# Patient Record
Sex: MALE | Race: BLACK OR AFRICAN AMERICAN | NOT HISPANIC OR LATINO
[De-identification: names, ages, dates, MRNs, and addresses within clinical notes are randomized per-mention and may not be internally consistent; named-entity substitution may affect disease eponyms.]

---

## 2017-04-19 ENCOUNTER — APPOINTMENT (OUTPATIENT)
Dept: UROLOGY | Facility: CLINIC | Age: 62
End: 2017-04-19

## 2017-05-07 ENCOUNTER — OUTPATIENT (OUTPATIENT)
Dept: OUTPATIENT SERVICES | Facility: HOSPITAL | Age: 62
LOS: 1 days | Discharge: HOME | End: 2017-05-07

## 2017-06-28 DIAGNOSIS — N20.2 CALCULUS OF KIDNEY WITH CALCULUS OF URETER: ICD-10-CM

## 2017-11-17 ENCOUNTER — OUTPATIENT (OUTPATIENT)
Dept: OUTPATIENT SERVICES | Facility: HOSPITAL | Age: 62
LOS: 1 days | Discharge: HOME | End: 2017-11-17

## 2017-11-17 ENCOUNTER — EMERGENCY (EMERGENCY)
Facility: HOSPITAL | Age: 62
LOS: 0 days | Discharge: HOME | End: 2017-11-18
Admitting: INTERNAL MEDICINE

## 2017-11-17 DIAGNOSIS — R10.31 RIGHT LOWER QUADRANT PAIN: ICD-10-CM

## 2017-11-17 DIAGNOSIS — N50.82 SCROTAL PAIN: ICD-10-CM

## 2017-11-17 DIAGNOSIS — Z79.899 OTHER LONG TERM (CURRENT) DRUG THERAPY: ICD-10-CM

## 2017-11-17 DIAGNOSIS — I10 ESSENTIAL (PRIMARY) HYPERTENSION: ICD-10-CM

## 2017-11-17 DIAGNOSIS — N20.0 CALCULUS OF KIDNEY: ICD-10-CM

## 2017-11-22 ENCOUNTER — APPOINTMENT (OUTPATIENT)
Dept: UROLOGY | Facility: CLINIC | Age: 62
End: 2017-11-22
Payer: COMMERCIAL

## 2017-11-22 VITALS
DIASTOLIC BLOOD PRESSURE: 87 MMHG | BODY MASS INDEX: 39.34 KG/M2 | HEIGHT: 71 IN | WEIGHT: 281 LBS | SYSTOLIC BLOOD PRESSURE: 134 MMHG | HEART RATE: 66 BPM

## 2017-11-22 DIAGNOSIS — Z78.9 OTHER SPECIFIED HEALTH STATUS: ICD-10-CM

## 2017-11-22 PROCEDURE — 99214 OFFICE O/P EST MOD 30 MIN: CPT

## 2017-12-27 ENCOUNTER — EMERGENCY (EMERGENCY)
Facility: HOSPITAL | Age: 62
LOS: 0 days | Discharge: HOME | End: 2017-12-27
Admitting: INTERNAL MEDICINE

## 2017-12-27 DIAGNOSIS — K52.9 NONINFECTIVE GASTROENTERITIS AND COLITIS, UNSPECIFIED: ICD-10-CM

## 2017-12-27 DIAGNOSIS — R10.9 UNSPECIFIED ABDOMINAL PAIN: ICD-10-CM

## 2018-01-11 ENCOUNTER — APPOINTMENT (OUTPATIENT)
Dept: UROLOGY | Facility: CLINIC | Age: 63
End: 2018-01-11
Payer: COMMERCIAL

## 2018-01-11 VITALS
WEIGHT: 281 LBS | HEIGHT: 71 IN | HEART RATE: 65 BPM | BODY MASS INDEX: 39.34 KG/M2 | DIASTOLIC BLOOD PRESSURE: 87 MMHG | SYSTOLIC BLOOD PRESSURE: 138 MMHG

## 2018-01-11 PROCEDURE — 99213 OFFICE O/P EST LOW 20 MIN: CPT

## 2018-03-06 ENCOUNTER — OTHER (OUTPATIENT)
Age: 63
End: 2018-03-06

## 2018-03-08 ENCOUNTER — APPOINTMENT (OUTPATIENT)
Dept: UROLOGY | Facility: CLINIC | Age: 63
End: 2018-03-08
Payer: COMMERCIAL

## 2018-03-08 VITALS
HEART RATE: 69 BPM | SYSTOLIC BLOOD PRESSURE: 145 MMHG | HEIGHT: 71 IN | BODY MASS INDEX: 39.34 KG/M2 | WEIGHT: 281 LBS | DIASTOLIC BLOOD PRESSURE: 78 MMHG

## 2018-03-08 PROCEDURE — 99213 OFFICE O/P EST LOW 20 MIN: CPT

## 2018-03-26 ENCOUNTER — OUTPATIENT (OUTPATIENT)
Dept: OUTPATIENT SERVICES | Facility: HOSPITAL | Age: 63
LOS: 1 days | Discharge: HOME | End: 2018-03-26

## 2018-03-26 DIAGNOSIS — C61 MALIGNANT NEOPLASM OF PROSTATE: ICD-10-CM

## 2018-04-12 ENCOUNTER — APPOINTMENT (OUTPATIENT)
Dept: UROLOGY | Facility: CLINIC | Age: 63
End: 2018-04-12
Payer: COMMERCIAL

## 2018-04-12 VITALS
WEIGHT: 281 LBS | BODY MASS INDEX: 39.34 KG/M2 | HEART RATE: 67 BPM | SYSTOLIC BLOOD PRESSURE: 150 MMHG | DIASTOLIC BLOOD PRESSURE: 93 MMHG | HEIGHT: 71 IN

## 2018-04-12 PROCEDURE — 99212 OFFICE O/P EST SF 10 MIN: CPT

## 2018-08-30 ENCOUNTER — APPOINTMENT (OUTPATIENT)
Dept: UROLOGY | Facility: CLINIC | Age: 63
End: 2018-08-30
Payer: COMMERCIAL

## 2018-08-30 VITALS
BODY MASS INDEX: 39.34 KG/M2 | HEIGHT: 71 IN | DIASTOLIC BLOOD PRESSURE: 86 MMHG | SYSTOLIC BLOOD PRESSURE: 134 MMHG | WEIGHT: 281 LBS | HEART RATE: 66 BPM

## 2018-08-30 PROCEDURE — 99213 OFFICE O/P EST LOW 20 MIN: CPT

## 2018-08-30 NOTE — REVIEW OF SYSTEMS
[see HPI] : see HPI [Negative] : Psychiatric [Erectile Dysfunction] : erectile dysfunction [FreeTextEntry1] : discomfort in the left groin

## 2018-08-30 NOTE — LETTER HEADER
[FreeTextEntry3] : Mikhail Aparicio M.D.\par Director of Urology\par [facility]\par [address] [place] [address], [address]\[place] [address]

## 2018-08-30 NOTE — HISTORY OF PRESENT ILLNESS
[Erectile Dysfunction] : Erectile Dysfunction [FreeTextEntry1] : Joaquin is a 62 year old male, with a history of Abdirizak 7 (4+3) prostate cancer, S/P RRP in 2005. He has a slowly rising PSA, currently being followed by Dr. Serrano. \par \par He is being managed with Tri-mix for ED, 0.05, with decent results. He said recently, that he is experiencing some decrease in rigidity after injection, though the erection he does get last about 45 minutes and is definitely usable. He has a history of nephrolithiasis, last imaged on 12/2017, which showed a 5 mm non obstructing RIGHT calculus. He denies any episodes for renal colic since his last visit. He denies any significant irritative/obstructive voiding symptoms.\par

## 2018-08-30 NOTE — ASSESSMENT
[FreeTextEntry1] : We discussed his erections. He is obtaining decent erections at 0.05 of Tri-mix however, he would like a bit more firmness. We discussed the options and he is electing to continue Tri-mix however, he will increase his dose from 0.05 to 0.075. He understands the risks of prolonged erections with this medications and to contact the office/go to ED if they occur. He also has the fibrosis so needs to keep the compression for the full five minutes. I do not know what happened with respect to his kidney stone and if it is growing larger we may have to preempt, he recently had renal colic and passed the stone but I believe that was on the other side. He will follow up here in January 2019 with CT scan. He will continue follow up with Dr. Serrano regarding his prostate cancer management.

## 2018-08-30 NOTE — PHYSICAL EXAM
[General Appearance - Well Developed] : well developed [General Appearance - Well Nourished] : well nourished [Normal Appearance] : normal appearance [Well Groomed] : well groomed [General Appearance - In No Acute Distress] : no acute distress [Bowel Sounds] : normal bowel sounds [Abdomen Soft] : soft [Abdomen Tenderness] : non-tender [Costovertebral Angle Tenderness] : no ~M costovertebral angle tenderness [Urethral Meatus] : meatus normal [Penis Abnormality] : normal uncircumcised penis [Urinary Bladder Findings] : the bladder was normal on palpation [Scrotum] : the scrotum was normal [Testes Mass (___cm)] : there were no testicular masses [Heart Rate And Rhythm] : Heart rate and rhythm were normal [Edema] : no peripheral edema [] : no respiratory distress [Respiration, Rhythm And Depth] : normal respiratory rhythm and effort [Exaggerated Use Of Accessory Muscles For Inspiration] : no accessory muscle use [Oriented To Time, Place, And Person] : oriented to person, place, and time [Affect] : the affect was normal [Mood] : the mood was normal [Not Anxious] : not anxious [Normal Station and Gait] : the gait and station were normal for the patient's age [No Focal Deficits] : no focal deficits [No Palpable Adenopathy] : no palpable adenopathy [Testes Tenderness] : no tenderness of the testes [FreeTextEntry1] : 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

## 2018-08-31 ENCOUNTER — OTHER (OUTPATIENT)
Age: 63
End: 2018-08-31

## 2018-10-02 ENCOUNTER — OUTPATIENT (OUTPATIENT)
Dept: OUTPATIENT SERVICES | Facility: HOSPITAL | Age: 63
LOS: 1 days | Discharge: HOME | End: 2018-10-02

## 2018-10-02 DIAGNOSIS — N20.0 CALCULUS OF KIDNEY: ICD-10-CM

## 2018-12-06 ENCOUNTER — APPOINTMENT (OUTPATIENT)
Dept: UROLOGY | Facility: CLINIC | Age: 63
End: 2018-12-06

## 2019-05-21 ENCOUNTER — APPOINTMENT (OUTPATIENT)
Dept: PLASTIC SURGERY | Facility: CLINIC | Age: 64
End: 2019-05-21
Payer: COMMERCIAL

## 2019-05-21 VITALS — BODY MASS INDEX: 37.8 KG/M2 | HEIGHT: 71 IN | WEIGHT: 270 LBS

## 2019-05-21 DIAGNOSIS — D17.21 BENIGN LIPOMATOUS NEOPLASM OF SKIN AND SUBCUTANEOUS TISSUE OF RIGHT ARM: ICD-10-CM

## 2019-05-21 PROCEDURE — 99203 OFFICE O/P NEW LOW 30 MIN: CPT

## 2019-05-21 RX ORDER — AMOXICILLIN 500 MG/1
500 TABLET, FILM COATED ORAL
Qty: 21 | Refills: 0 | Status: DISCONTINUED | COMMUNITY
Start: 2017-11-20 | End: 2019-05-21

## 2019-05-21 RX ORDER — CIPROFLOXACIN HYDROCHLORIDE 500 MG/1
500 TABLET, FILM COATED ORAL
Qty: 14 | Refills: 0 | Status: DISCONTINUED | COMMUNITY
Start: 2017-12-28 | End: 2019-05-21

## 2019-05-21 NOTE — ASSESSMENT
[FreeTextEntry1] : 64 yo M with right volar wrist  mass c/w lipoma. \par \par - recommend excision in OLY\par \par as above\par \par lipoma approx 4cm x 3cm\par \par Regarding the hand surgery, we discussed the risk of bleeding, infection, need for additional unplanned surgery, need for postoperative hand occupational therapy, possible lack of improvement and diminished hand function.  We discussed prolonged recovery.  All questions were answered and all risks were well understood by the patient.\par

## 2019-05-21 NOTE — HISTORY OF PRESENT ILLNESS
[FreeTextEntry1] : 64 yo M with PMH of HTN and Prostate Ca s/p radical prostatectomy in 2005 who is RHD and presents today for evaluation of right volar wrist subcutaneous mass for 8 years. Reports gradual increase in size causing pain and discomfort.  Denies any hand trauma. NO prior injections or aspirations.

## 2019-05-21 NOTE — PHYSICAL EXAM
[de-identified] : well-developed well groomed male, overweight  [de-identified] : NC/AT [de-identified] : PERRL [de-identified] : supple [de-identified] : good inspiratory effort [de-identified] : TASNEEMR [de-identified] : soft, nontender [de-identified] : right volar wrist with a soft, mobile subcutaneous mass c/w lipoma, nontender, FROM of the wrist joint with intact sensation

## 2019-05-30 ENCOUNTER — APPOINTMENT (OUTPATIENT)
Dept: SURGERY | Facility: CLINIC | Age: 64
End: 2019-05-30
Payer: COMMERCIAL

## 2019-05-30 VITALS
HEIGHT: 71 IN | OXYGEN SATURATION: 98 % | DIASTOLIC BLOOD PRESSURE: 85 MMHG | SYSTOLIC BLOOD PRESSURE: 135 MMHG | WEIGHT: 270 LBS | HEART RATE: 67 BPM | BODY MASS INDEX: 37.8 KG/M2

## 2019-05-30 DIAGNOSIS — R22.1 LOCALIZED SWELLING, MASS AND LUMP, NECK: ICD-10-CM

## 2019-05-30 PROCEDURE — 99243 OFF/OP CNSLTJ NEW/EST LOW 30: CPT

## 2019-05-30 NOTE — ASSESSMENT
[FreeTextEntry1] : After examination patient was explained about the findings of the MRI from September of 2018. As per patient's history the mass has increased in size. Because of the recommendation of the MRI report from 2018 as well as increasing in size a followup MRI was recommended. We will try to get authorization of MRI of the soft tissue of the neck. I called [facility] radiology and talk to the chief technician Moshe for further discussion of the MRI. Patient was told to return to the office after the MRI is done for further evaluation and treatment.

## 2019-05-30 NOTE — DATA REVIEWED
[FreeTextEntry1] : The radiological report from 2018 September when reviewed. The sonogram failed to show any mass in the left supraclavicular region. MRI showed a 5 x 4 cm fatty mass in the left supraclavicular region and followup MRI of the soft tissue of the neck with and without contrast was suggested.

## 2019-05-30 NOTE — HISTORY OF PRESENT ILLNESS
[de-identified] : Patient presents to the office with a history of left-sided neck mass that has been growing. First time he noticed the mass was more than 12 years ago. As per  the patient the mass has been increasing in size.

## 2019-05-30 NOTE — PHYSICAL EXAM
[No Rash or Lesion] : No rash or lesion [Purpura] : no purpura  [Petechiae] : no petechiae [Skin Ulcer] : no ulcer [Alert] : alert [Oriented to Person] : oriented to person [Oriented to Place] : oriented to place [Oriented to Time] : oriented to time [Calm] : calm [de-identified] : normal [de-identified] : In the left supraclavicular region approximately 7 cm size mass was palpated. The mass is nontender.

## 2019-05-30 NOTE — CONSULT LETTER
[Dear  ___] : Dear  [unfilled], [Consult Letter:] : I had the pleasure of evaluating your patient, [unfilled]. [Please see my note below.] : Please see my note below. [Consult Closing:] : Thank you very much for allowing me to participate in the care of this patient.  If you have any questions, please do not hesitate to contact me. [Sincerely,] : Sincerely, [FreeTextEntry3] : Jose Mcgregor MD, FACS\par [facility]\[place] [address]\par

## 2019-06-13 ENCOUNTER — APPOINTMENT (OUTPATIENT)
Dept: UROLOGY | Facility: CLINIC | Age: 64
End: 2019-06-13
Payer: COMMERCIAL

## 2019-06-13 VITALS
BODY MASS INDEX: 40.6 KG/M2 | SYSTOLIC BLOOD PRESSURE: 149 MMHG | WEIGHT: 290 LBS | HEART RATE: 70 BPM | HEIGHT: 71 IN | DIASTOLIC BLOOD PRESSURE: 87 MMHG

## 2019-06-13 DIAGNOSIS — E66.01 MORBID (SEVERE) OBESITY DUE TO EXCESS CALORIES: ICD-10-CM

## 2019-06-13 PROCEDURE — 99214 OFFICE O/P EST MOD 30 MIN: CPT

## 2019-06-13 RX ORDER — METRONIDAZOLE 500 MG/1
500 TABLET ORAL
Qty: 21 | Refills: 0 | Status: COMPLETED | COMMUNITY
Start: 2017-12-28 | End: 2019-06-13

## 2019-06-13 NOTE — ADDENDUM
[FreeTextEntry1] : Please note after the exam was finished he told me’s been having some pain in the left groin when he lies on his abdomen. I did not find any signs of hernia and what we’ll do is just take another look they are either when he sees Dr. Serrano and myself.

## 2019-06-13 NOTE — PHYSICAL EXAM
[General Appearance - Well Developed] : well developed [Normal Appearance] : normal appearance [General Appearance - Well Nourished] : well nourished [Well Groomed] : well groomed [General Appearance - In No Acute Distress] : no acute distress [Abdomen Tenderness] : non-tender [Abdomen Soft] : soft [Costovertebral Angle Tenderness] : no ~M costovertebral angle tenderness [Urethral Meatus] : meatus normal [Penis Abnormality] : normal uncircumcised penis [Epididymis] : the epididymides were normal [Testes Tenderness] : no tenderness of the testes [Testes Mass (___cm)] : there were no testicular masses [Heart Rate And Rhythm] : Heart rate and rhythm were normal [FreeTextEntry1] : rle mild edema [] : no respiratory distress [Respiration, Rhythm And Depth] : normal respiratory rhythm and effort [Exaggerated Use Of Accessory Muscles For Inspiration] : no accessory muscle use [Auscultation Breath Sounds / Voice Sounds] : lungs were clear to auscultation bilaterally [Oriented To Time, Place, And Person] : oriented to person, place, and time [Affect] : the affect was normal [Not Anxious] : not anxious [Mood] : the mood was normal [Normal Station and Gait] : the gait and station were normal for the patient's age [No Focal Deficits] : no focal deficits

## 2019-06-13 NOTE — ASSESSMENT
[FreeTextEntry1] : His physical exam shows increasing signs of obesity. He has a slump in his left clavicle which to me feels like a lipoma but an MRI is pending. His penis does not have any major signs of scarring his testicles are not atrophied and I don’t feel a hernia. I did not do a ASIA as we are sending him for a PSA tomorrow and he will be seen Dr. Serrano re-his prostate cancer.\par \par From urologic side we will send him for a CAT scan, I’m sending him for a.m. bloods to check his hormones and his PSA, I am reordering the tri-mix so will get a fresh bottle and will drop down to 0.04 raising it as needed until he gets a good erection or he gets a bad reaction. He will come back in to review the hormones, his response of the new tri-mix the CAT scan and his prostate cancer will be evaluated by Dr. Serrano\par

## 2019-06-13 NOTE — REVIEW OF SYSTEMS
[see HPI] : see HPI [Erectile Dysfunction] : erectile dysfunction [Negative] : Psychiatric [FreeTextEntry1] : discomfort in the left groin, increasing obesity

## 2019-06-13 NOTE — LETTER BODY
[Dear  ___] : Dear  [unfilled], [Courtesy Letter:] : I had the pleasure of seeing your patient, [unfilled], in my office today. [Please see my note below.] : Please see my note below. [Sincerely,] : Sincerely, [FreeTextEntry2] : Margaret Pride MD\par [address] [place] [address]\[place] [address]\par

## 2019-06-13 NOTE — LETTER HEADER
[FreeTextEntry3] : Mikhail Aparicio M.D.\par Director of Urology\par [facility]\par [address] [place] [address], [address]\[place] [address]

## 2019-06-13 NOTE — HISTORY OF PRESENT ILLNESS
[FreeTextEntry1] : Gaston is not been seen since August 2018. At that point we had blood test that showed him to have low testosterone but his PSA is becoming detectable. He went to see Dr. Serrano who is been following him with repeat bloods pending but not yet been done. He also had kidney stones with the CAT scan showing two stones on the right. He is not had for a kidney stone pain though he has passed some of the past, and hasn’t had an evaluation since October 2018 either. Finally he has been on tri-mix initially with 0.03 which was posted go up to 0.075. He’s not using it that often, and he tells me that when he does lately that is not working that well. However the stuff is over six months old and may be getting week and or he may be progressing. Please note his weight has gone up probably at least 20 pounds and is now over 290. When asked he does acknowledge that his wife’s weight is in competition with his and it just makes it that much more difficult when the both morbidly obese to have good penis vaginal contact. However they still enjoy each other and I still have fun and he still wants it to work well enough that they can continue. [Currently Experiencing ___] :  [unfilled] [Erectile Dysfunction] : Erectile Dysfunction

## 2019-06-28 ENCOUNTER — OTHER (OUTPATIENT)
Age: 64
End: 2019-06-28

## 2019-07-01 ENCOUNTER — APPOINTMENT (OUTPATIENT)
Dept: CARDIOLOGY | Facility: CLINIC | Age: 64
End: 2019-07-01

## 2019-07-05 ENCOUNTER — OUTPATIENT (OUTPATIENT)
Dept: OUTPATIENT SERVICES | Facility: HOSPITAL | Age: 64
LOS: 1 days | Discharge: HOME | End: 2019-07-05

## 2019-07-05 DIAGNOSIS — N52.31 ERECTILE DYSFUNCTION FOLLOWING RADICAL PROSTATECTOMY: ICD-10-CM

## 2019-07-05 DIAGNOSIS — R79.89 OTHER SPECIFIED ABNORMAL FINDINGS OF BLOOD CHEMISTRY: ICD-10-CM

## 2019-07-05 DIAGNOSIS — N20.0 CALCULUS OF KIDNEY: ICD-10-CM

## 2019-07-08 ENCOUNTER — APPOINTMENT (OUTPATIENT)
Dept: UROLOGY | Facility: CLINIC | Age: 64
End: 2019-07-08

## 2019-07-08 LAB
ALBUMIN SERPL ELPH-MCNC: 4.4 G/DL
ALP BLD-CCNC: 62 U/L
ALT SERPL-CCNC: 18 U/L
AST SERPL-CCNC: 16 U/L
BASOPHILS # BLD AUTO: 0.02 K/UL
BASOPHILS NFR BLD AUTO: 0.5 %
BILIRUB DIRECT SERPL-MCNC: <0.2 MG/DL
BILIRUB INDIRECT SERPL-MCNC: >0.5 MG/DL
BILIRUB SERPL-MCNC: 0.7 MG/DL
EOSINOPHIL # BLD AUTO: 0.13 K/UL
EOSINOPHIL NFR BLD AUTO: 3.1 %
ESTRADIOL SERPL-MCNC: 28 PG/ML
HCT VFR BLD CALC: 42.1 %
HGB BLD-MCNC: 14.2 G/DL
IMM GRANULOCYTES NFR BLD AUTO: 0 %
LH SERPL-ACNC: 3.8 IU/L
LYMPHOCYTES # BLD AUTO: 2.11 K/UL
LYMPHOCYTES NFR BLD AUTO: 51.1 %
MAN DIFF?: NORMAL
MCHC RBC-ENTMCNC: 29.8 PG
MCHC RBC-ENTMCNC: 33.7 G/DL
MCV RBC AUTO: 88.4 FL
MONOCYTES # BLD AUTO: 0.42 K/UL
MONOCYTES NFR BLD AUTO: 10.2 %
NEUTROPHILS # BLD AUTO: 1.45 K/UL
NEUTROPHILS NFR BLD AUTO: 35.1 %
PLATELET # BLD AUTO: 240 K/UL
PROT SERPL-MCNC: 7.7 G/DL
PSA FREE FLD-MCNC: 2 %
PSA FREE SERPL-MCNC: 0.02 NG/ML
PSA SERPL-MCNC: 1.34 NG/ML
RBC # BLD: 4.76 M/UL
RBC # FLD: 13 %
WBC # FLD AUTO: 4.13 K/UL

## 2019-07-10 LAB
SHBG SERPL-SCNC: 37 NMOL/L
TESTOST BND SERPL-MCNC: 5.21 NG/DL
TESTOSTERONE BIOAVAILABLE: 38 NG/DL
TESTOSTERONE TOTAL S: 237 NG/DL

## 2019-07-11 ENCOUNTER — APPOINTMENT (OUTPATIENT)
Dept: UROLOGY | Facility: CLINIC | Age: 64
End: 2019-07-11
Payer: COMMERCIAL

## 2019-07-11 ENCOUNTER — LABORATORY RESULT (OUTPATIENT)
Age: 64
End: 2019-07-11

## 2019-07-11 ENCOUNTER — OUTPATIENT (OUTPATIENT)
Dept: OUTPATIENT SERVICES | Facility: HOSPITAL | Age: 64
LOS: 1 days | Discharge: HOME | End: 2019-07-11

## 2019-07-11 VITALS
SYSTOLIC BLOOD PRESSURE: 137 MMHG | HEIGHT: 71 IN | HEART RATE: 61 BPM | BODY MASS INDEX: 40.6 KG/M2 | DIASTOLIC BLOOD PRESSURE: 89 MMHG | WEIGHT: 290 LBS

## 2019-07-11 DIAGNOSIS — R79.89 OTHER SPECIFIED ABNORMAL FINDINGS OF BLOOD CHEMISTRY: ICD-10-CM

## 2019-07-11 DIAGNOSIS — R74.8 ABNORMAL LEVELS OF OTHER SERUM ENZYMES: ICD-10-CM

## 2019-07-11 LAB
% FREE TESTOSTERONE - ESO: 1.4 %
FREE TESTOSTERONE - ESO: 34 PG/ML
SHBG-ESOTERIX: 47.9 NMOL/L
TESTOSTERONE SERUM - ESO: 242 NG/DL

## 2019-07-11 PROCEDURE — 99214 OFFICE O/P EST MOD 30 MIN: CPT

## 2019-07-11 NOTE — REVIEW OF SYSTEMS
[Erectile Dysfunction] : erectile dysfunction [see HPI] : see HPI [Joint Pain] : joint pain [Negative] : Heme/Lymph

## 2019-07-11 NOTE — LETTER BODY
[Courtesy Letter:] : I had the pleasure of seeing your patient, [unfilled], in my office today. [Dear  ___] : Dear  [unfilled], [Please see my note below.] : Please see my note below. [Sincerely,] : Sincerely, [FreeTextEntry2] : Margaret Pride MD\par [address] [place] [address]\[place] [address]\par

## 2019-07-11 NOTE — LETTER HEADER
[FreeTextEntry3] : Mikhail Aparicio M.D.\par Director of Urology\par [facility]\par [address] [place] [address], [address]\[place] [address]

## 2019-07-11 NOTE — PHYSICAL EXAM
[Normal Appearance] : normal appearance [General Appearance - Well Developed] : well developed [General Appearance - Well Nourished] : well nourished [General Appearance - In No Acute Distress] : no acute distress [Well Groomed] : well groomed [] : no respiratory distress [FreeTextEntry1] : obese [Respiration, Rhythm And Depth] : normal respiratory rhythm and effort [Oriented To Time, Place, And Person] : oriented to person, place, and time [Exaggerated Use Of Accessory Muscles For Inspiration] : no accessory muscle use [Mood] : the mood was normal [Affect] : the affect was normal [Not Anxious] : not anxious [Normal Station and Gait] : the gait and station were normal for the patient's age

## 2019-07-11 NOTE — ASSESSMENT
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 His PSA something he will review with Dr. Serrano was 1.34 with the free PSA of only 2%. In September 2018 it was 1.1 so it’s gone up about 20% in 10 months. The rest of his was at an acceptable hemoglobin at 14.2 platelet count at 240 K. Liver function tests were normal while the LH was 3.8 and estradiol 28. Sex hormone binding globulin was 37\par The CAT scan done at [facility] July 10, 2019 showed a persistent 4 cm cyst in the inferior Gael the left kidney and an 8 mm stone in the inferior region of the right kidney. There was no evidence of obstruction he is status post prostatectomy and the bladder was empty. There were multiple live the lesion seen probably cysts was stones in the gallbladder and a right upper quadrant ultrasound is recommended this is something he will bring up this primary care doctor and probably see a general surgeon four.\par With respect to his erectile dysfunction he should raise the dose as needed until he gets to a dose that gets him rigid enough long enough without going too high. The overdoses himself he knows to contact us as he may need reversal with phenylephrine.\par With respect to the kidney stone he will send him for a KUB and he will get me the KUB and the CT on disk so we can see if he is a candidate for shockwave lithotripsy. I’d like to break that stone up before it falls into the ureter and blocks him\par With respect to his rising PSA is going to see Dr. Serrano\par With respect to his hypogonadism he has enough libido and I don’t want to consider testosterone if his prostate cancer has returned. However I do want to check his bone density has given his hypogonadism and that is something that can become a problem\par

## 2019-07-11 NOTE — HISTORY OF PRESENT ILLNESS
[FreeTextEntry1] : Gaston was seen on June 13. We raise the dose of his tri-mix with them getting a new supply still at a very low dose going up to 0.07 mL of formula number five. He went up to 0.6 incessant work better he was able to get inside he was able to last for some time but not enough for him to finish though his wife did. Is going to go up to 0.07 the next time and will see where it goes he had no trouble doing the injection.\par \par With respect to his hormones bloods were drawn on July 5 he is here for review\par \par With respect to his kidney stones a CAT scan was done on July 10 I do not have the disc but I do have the report and is here to review that.\par  [Currently Experiencing ___] :  [unfilled] [Erectile Dysfunction] : Erectile Dysfunction

## 2019-07-19 ENCOUNTER — MESSAGE (OUTPATIENT)
Age: 64
End: 2019-07-19

## 2019-08-08 ENCOUNTER — APPOINTMENT (OUTPATIENT)
Dept: UROLOGY | Facility: CLINIC | Age: 64
End: 2019-08-08

## 2019-08-19 ENCOUNTER — APPOINTMENT (OUTPATIENT)
Dept: PLASTIC SURGERY | Facility: AMBULATORY SURGERY CENTER | Age: 64
End: 2019-08-19

## 2019-08-20 ENCOUNTER — APPOINTMENT (OUTPATIENT)
Dept: SURGERY | Facility: CLINIC | Age: 64
End: 2019-08-20
Payer: COMMERCIAL

## 2019-08-20 PROCEDURE — 99213 OFFICE O/P EST LOW 20 MIN: CPT

## 2019-08-20 NOTE — CONSULT LETTER
[Dear  ___] : Dear  [unfilled], [Courtesy Letter:] : I had the pleasure of seeing your patient, [unfilled], in my office today. [Please see my note below.] : Please see my note below. [Referral Closing:] : Thank you very much for seeing this patient.  If you have any questions, please do not hesitate to contact me. [Sincerely,] : Sincerely, [FreeTextEntry3] : Jose Mcgregor MD, FACS\par [facility]\[place] [address]\par

## 2019-08-20 NOTE — ASSESSMENT
[FreeTextEntry1] : On examination the mass is unchanged. Is not a separately identifiable mass. Possibility of more prominent subcutaneous tissue was explained to the patient. At times with the neck noting in the opposite direction the mass or the swelling is not even visible. Patient and his wife were explained about the CAT scan findings in detail. The CAT scan also mentioned that there is no change since 2016 CAT scan. MRI report was also reviewed. Sonogram report was also explained again. Patient does not have any ENT symptoms namely change in his voice or chronic cough. The CAT scan also mentioned thyroid and the lymph glands being normal. Followup CAT scan or radiological study in 6 months to a year explained to the patient. Followup in the office as needed or if there is any change in the swelling of the left neck region.

## 2019-08-20 NOTE — REVIEW OF SYSTEMS
[Fever] : no fever [Chills] : no chills [Abdominal Pain] : no abdominal pain [Vomiting] : no vomiting [Constipation] : no constipation [Diarrhea] : no diarrhea [Dizziness] : no dizziness

## 2019-08-28 ENCOUNTER — APPOINTMENT (OUTPATIENT)
Dept: PLASTIC SURGERY | Facility: CLINIC | Age: 64
End: 2019-08-28

## 2019-08-28 ENCOUNTER — APPOINTMENT (OUTPATIENT)
Dept: UROLOGY | Facility: CLINIC | Age: 64
End: 2019-08-28

## 2019-10-12 ENCOUNTER — FORM ENCOUNTER (OUTPATIENT)
Age: 64
End: 2019-10-12

## 2019-10-13 ENCOUNTER — OUTPATIENT (OUTPATIENT)
Dept: OUTPATIENT SERVICES | Facility: HOSPITAL | Age: 64
LOS: 1 days | Discharge: HOME | End: 2019-10-13
Payer: COMMERCIAL

## 2019-10-13 DIAGNOSIS — N20.0 CALCULUS OF KIDNEY: ICD-10-CM

## 2019-10-13 DIAGNOSIS — R10.9 UNSPECIFIED ABDOMINAL PAIN: ICD-10-CM

## 2019-10-13 PROCEDURE — 74019 RADEX ABDOMEN 2 VIEWS: CPT | Mod: 26

## 2019-10-14 ENCOUNTER — APPOINTMENT (OUTPATIENT)
Dept: UROLOGY | Facility: CLINIC | Age: 64
End: 2019-10-14
Payer: COMMERCIAL

## 2019-10-14 PROCEDURE — 99213 OFFICE O/P EST LOW 20 MIN: CPT

## 2019-10-14 NOTE — LETTER BODY
[Dear  ___] : Dear  [unfilled], [Consult Letter:] : I had the pleasure of evaluating your patient, [unfilled]. [Please see my note below.] : Please see my note below. [Consult Closing:] : Thank you very much for allowing me to participate in the care of this patient.  If you have any questions, please do not hesitate to contact me. [Sincerely,] : Sincerely, [FreeTextEntry3] : Lennox Serrano MD, FACS\par

## 2019-10-14 NOTE — ASSESSMENT
[FreeTextEntry1] : 64 yo with BCR following RRP\par \par - AXUMIN study ordered\par discussed the benefit of an axumin study to assess potential metastatic disease\par all questions answered\par \par - f/u in 3 weeks

## 2019-10-14 NOTE — HISTORY OF PRESENT ILLNESS
[FreeTextEntry1] : 64 yo with Abdirizak 3+4 prostate cancer\par s/p RRP 2005 with detectable PSA 0.9 cc in 11/2017\par PSA 2/27/2018 - 1.0 ng/ml\par PSA 7/2019 - 1.34 ng/ml\par \par CT scan 12/27/2017 - no evidence of metastatic disease\par \par Bone scan 3/26/2018 - No metastatic disease\par \par patient reports no new complaints [Urinary Incontinence] : urinary incontinence

## 2019-10-14 NOTE — PHYSICAL EXAM
[General Appearance - Well Developed] : well developed [General Appearance - Well Nourished] : well nourished [Normal Appearance] : normal appearance [Well Groomed] : well groomed [General Appearance - In No Acute Distress] : no acute distress [Abdomen Soft] : soft [Abdomen Tenderness] : non-tender [Costovertebral Angle Tenderness] : no ~M costovertebral angle tenderness [Urethral Meatus] : meatus normal [Urinary Bladder Findings] : the bladder was normal on palpation [Scrotum] : the scrotum was normal [Testes Mass (___cm)] : there were no testicular masses [Edema] : no peripheral edema [] : no respiratory distress [Respiration, Rhythm And Depth] : normal respiratory rhythm and effort [Exaggerated Use Of Accessory Muscles For Inspiration] : no accessory muscle use [Oriented To Time, Place, And Person] : oriented to person, place, and time [Affect] : the affect was normal [Mood] : the mood was normal [Not Anxious] : not anxious [Normal Station and Gait] : the gait and station were normal for the patient's age [No Focal Deficits] : no focal deficits [No Palpable Adenopathy] : no palpable adenopathy [FreeTextEntry1] : no rectal nodules

## 2019-10-17 ENCOUNTER — OTHER (OUTPATIENT)
Age: 64
End: 2019-10-17

## 2019-11-04 ENCOUNTER — OUTPATIENT (OUTPATIENT)
Dept: OUTPATIENT SERVICES | Facility: HOSPITAL | Age: 64
LOS: 1 days | Discharge: HOME | End: 2019-11-04
Payer: COMMERCIAL

## 2019-11-04 DIAGNOSIS — C61 MALIGNANT NEOPLASM OF PROSTATE: ICD-10-CM

## 2019-11-04 DIAGNOSIS — R05 COUGH: ICD-10-CM

## 2019-11-04 LAB — GLUCOSE BLDC GLUCOMTR-MCNC: 97 MG/DL — SIGNIFICANT CHANGE UP (ref 70–99)

## 2019-11-04 PROCEDURE — 78815 PET IMAGE W/CT SKULL-THIGH: CPT | Mod: 26,PI

## 2019-11-07 ENCOUNTER — APPOINTMENT (OUTPATIENT)
Dept: UROLOGY | Facility: CLINIC | Age: 64
End: 2019-11-07
Payer: COMMERCIAL

## 2019-11-07 PROCEDURE — 99214 OFFICE O/P EST MOD 30 MIN: CPT

## 2019-11-07 NOTE — PHYSICAL EXAM
[General Appearance - Well Developed] : well developed [General Appearance - Well Nourished] : well nourished [Well Groomed] : well groomed [Normal Appearance] : normal appearance [Abdomen Tenderness] : non-tender [General Appearance - In No Acute Distress] : no acute distress [Abdomen Soft] : soft [Costovertebral Angle Tenderness] : no ~M costovertebral angle tenderness [Edema] : no peripheral edema [] : no respiratory distress [Respiration, Rhythm And Depth] : normal respiratory rhythm and effort [Exaggerated Use Of Accessory Muscles For Inspiration] : no accessory muscle use [Oriented To Time, Place, And Person] : oriented to person, place, and time [Affect] : the affect was normal [Mood] : the mood was normal [No Focal Deficits] : no focal deficits [Normal Station and Gait] : the gait and station were normal for the patient's age [Not Anxious] : not anxious

## 2019-11-17 NOTE — ADDENDUM
[FreeTextEntry1] : Documented by Lakshmi White NP acting as a scribe for Dr. Lennox Serrano \par \par All medical record entries made by the Scribe were at my,  direction and\par personally dictated by me.  I have reviewed the chart and agree that the record\par accurately reflects my personal performance of the history, physical exam, procedure and imaging.  \par \par

## 2019-11-17 NOTE — HISTORY OF PRESENT ILLNESS
[None] : None [FreeTextEntry1] : PIPE TOMAS is a 64 year old male with a past medical history of prostate cancer Abdirizak 3 + 4 , s/p RRP 2005 with detectable PSA 0.9 in 11/2017. A CT scan was done in 12/2017 which revealed no evidence of metastatic disease. The bone scan on 3/2018 also revealed no metastatic disease. The PSA has been increasing to 1.0 ng/ml on 2/2018 and then to 1.34 ng/ml on 7/2019. Presents to the office today for a follow up, last seen on 10/14/2019. \par \par Axumin study 11/4/2019\par - Abnormal uptake of axumin, visually greater than blood pool activity, in bilateral inguinal nodes which measure up to 2.4 x 1.5 cm on the left; this finding is nonspecific, however may reflect tumor activity in the setting of rising PSA.  \par -No additional foci of abnormal axumin uptake.  \par  -Few bilobar hepatic cysts. Nonobstructing right renal lower pole calculi. Left renal interpolar cyst.

## 2019-11-25 ENCOUNTER — APPOINTMENT (OUTPATIENT)
Dept: UROLOGY | Facility: CLINIC | Age: 64
End: 2019-11-25
Payer: COMMERCIAL

## 2019-11-25 VITALS
DIASTOLIC BLOOD PRESSURE: 88 MMHG | HEIGHT: 71 IN | WEIGHT: 290 LBS | SYSTOLIC BLOOD PRESSURE: 132 MMHG | BODY MASS INDEX: 40.6 KG/M2 | HEART RATE: 74 BPM

## 2019-11-25 PROCEDURE — 99214 OFFICE O/P EST MOD 30 MIN: CPT

## 2019-11-25 NOTE — ASSESSMENT
[FreeTextEntry1] : We'll increase his dose at 0.05 increments until he has improved efficacy. If he finds he cannot get improved efficacy clogged up to 0.2 or even 0.3 ml they will let us know. otherwise followup in 6 months to\par \par With regard to his testosterone levels, I discussed the case with Dr. Serrano to see if he is a candidate for testosterone replacement therapy. Given the presence of biochemical recurrence he feels he is a very poor candidate. This was discussed with Gaston and practically speaking as long as his libido is good enough and I can get him functioning with our slightly higher dose of tri-mix especially considering that currently he is on a very low dose that would probably be in his best interest. We know the testosterone deficiency has other issues that are a problem, however the risks of activating prostate cancer a higher.\par \par Concerning his renal stone we'll continue observation he understands it may fall out at any point in time and if it doesn’t bother some he will call us

## 2019-11-25 NOTE — LETTER BODY
[Dear  ___] : Dear  [unfilled], [Courtesy Letter:] : I had the pleasure of seeing your patient, [unfilled], in my office today. [Please see my note below.] : Please see my note below. [Sincerely,] : Sincerely, [FreeTextEntry2] : Margaret Pride MD\par [address] [place] [address]\[place] [address]\par

## 2019-11-25 NOTE — PHYSICAL EXAM
[General Appearance - Well Developed] : well developed [Normal Appearance] : normal appearance [General Appearance - Well Nourished] : well nourished [Well Groomed] : well groomed [General Appearance - In No Acute Distress] : no acute distress [Abdomen Soft] : soft [Abdomen Tenderness] : non-tender [Costovertebral Angle Tenderness] : no ~M costovertebral angle tenderness [Urinary Bladder Findings] : the bladder was normal on palpation [Urethral Meatus] : meatus normal [Penis Abnormality] : normal uncircumcised penis [Testes Tenderness] : no tenderness of the testes [Scrotum] : the scrotum was normal [Testes Mass (___cm)] : there were no testicular masses [Edema] : no peripheral edema [] : no respiratory distress [Respiration, Rhythm And Depth] : normal respiratory rhythm and effort [Exaggerated Use Of Accessory Muscles For Inspiration] : no accessory muscle use [Affect] : the affect was normal [Oriented To Time, Place, And Person] : oriented to person, place, and time [Mood] : the mood was normal [Not Anxious] : not anxious [Normal Station and Gait] : the gait and station were normal for the patient's age [No Focal Deficits] : no focal deficits [FreeTextEntry1] : No evidence of penile fibrosis

## 2019-11-25 NOTE — HISTORY OF PRESENT ILLNESS
[Erectile Dysfunction] : Erectile Dysfunction [FreeTextEntry1] : Gaston is a 64-year-old male who we have been following for erectile dysfunction, status post RRP approximately 14 years ago.\par \par Currently he is being managed with formula #5 off Tri-mix however of late, he has been having diminished efficacy. He is using up to 0.1 mL and is able to obtain a slight erection not nearly what he would like and nothing compared to what he used to get with even half the dose.\par \par He has history of significantly decreased testosterone levels however, he has history of prostate cancer, which according to his PSA/Axumin study may be recurring.\par \par He is continuing to follow up with Dr. Serrano regarding his BCR prostate cancer.\par \par Additionally has history of renal stones and he presents to review his KUB x-ray.

## 2019-11-25 NOTE — LETTER HEADER
[FreeTextEntry3] : Mikhail Aparicio M.D.\par Director of Urology\par [facility]\par [address] [place] [address], [address]\[place] [address]

## 2020-01-06 ENCOUNTER — APPOINTMENT (OUTPATIENT)
Dept: CARDIOLOGY | Facility: CLINIC | Age: 65
End: 2020-01-06
Payer: COMMERCIAL

## 2020-01-06 PROCEDURE — 93000 ELECTROCARDIOGRAM COMPLETE: CPT

## 2020-01-06 PROCEDURE — 99213 OFFICE O/P EST LOW 20 MIN: CPT

## 2020-05-27 ENCOUNTER — APPOINTMENT (OUTPATIENT)
Dept: UROLOGY | Facility: CLINIC | Age: 65
End: 2020-05-27
Payer: COMMERCIAL

## 2020-05-27 ENCOUNTER — APPOINTMENT (OUTPATIENT)
Dept: CARDIOLOGY | Facility: CLINIC | Age: 65
End: 2020-05-27
Payer: COMMERCIAL

## 2020-05-27 VITALS
TEMPERATURE: 98.9 F | DIASTOLIC BLOOD PRESSURE: 73 MMHG | HEART RATE: 68 BPM | BODY MASS INDEX: 40.6 KG/M2 | WEIGHT: 290 LBS | SYSTOLIC BLOOD PRESSURE: 119 MMHG | HEIGHT: 71 IN

## 2020-05-27 PROCEDURE — 99213 OFFICE O/P EST LOW 20 MIN: CPT | Mod: 95

## 2020-05-27 PROCEDURE — 99214 OFFICE O/P EST MOD 30 MIN: CPT

## 2020-05-27 NOTE — REVIEW OF SYSTEMS
[Joint Pain] : joint pain [see HPI] : see HPI [Itching] : itching [Erectile Dysfunction] : erectile dysfunction [Negative] : Heme/Lymph

## 2020-06-01 ENCOUNTER — APPOINTMENT (OUTPATIENT)
Dept: CARDIOLOGY | Facility: CLINIC | Age: 65
End: 2020-06-01

## 2020-06-02 NOTE — LETTER HEADER
[FreeTextEntry3] : Mikhail Aparicio M.D.\par Director of Urology\par [facility]\par [address] [place] [address], [address]\[place] [address]

## 2020-06-02 NOTE — ASSESSMENT
[FreeTextEntry1] : With respect to his prostate cancer he needs attached the scan and the PSA he will do that and then see Dr. Serrano \par With respect to his kidney stones he needs a renal ultrasound we reordered that.\par \par With respect to his erectile dysfunction we reordered the tri-mix and he will use the minimum that he can but as much as he has to.\par I will see him again after he gets the studies done whether it is TeleMed or in person will be up to him.\par \par In the interim until the CaP is settled hold on testosterone replacement unless and if DEXA shows osteopenia and or porosis\par

## 2020-06-02 NOTE — PHYSICAL EXAM
[General Appearance - Well Developed] : well developed [General Appearance - Well Nourished] : well nourished [Normal Appearance] : normal appearance [Well Groomed] : well groomed [General Appearance - In No Acute Distress] : no acute distress [Abdomen Tenderness] : non-tender [Abdomen Soft] : soft [Costovertebral Angle Tenderness] : no ~M costovertebral angle tenderness [Urethral Meatus] : meatus normal [Penis Abnormality] : normal uncircumcised penis [Epididymis] : the epididymides were normal [Testes Tenderness] : no tenderness of the testes [Testes Mass (___cm)] : there were no testicular masses [Heart Rate And Rhythm] : Heart rate and rhythm were normal [FreeTextEntry1] : rle mild edema [] : no respiratory distress [Respiration, Rhythm And Depth] : normal respiratory rhythm and effort [Exaggerated Use Of Accessory Muscles For Inspiration] : no accessory muscle use [Auscultation Breath Sounds / Voice Sounds] : lungs were clear to auscultation bilaterally [Oriented To Time, Place, And Person] : oriented to person, place, and time [Affect] : the affect was normal [Mood] : the mood was normal [Not Anxious] : not anxious [No Focal Deficits] : no focal deficits

## 2020-06-02 NOTE — LETTER BODY
[Courtesy Letter:] : I had the pleasure of seeing your patient, [unfilled], in my office today. [Dear  ___] : Dear  [unfilled], [Sincerely,] : Sincerely, [Please see my note below.] : Please see my note below. [FreeTextEntry2] : Margaret Pride MD\par [address] [place] [address]\[place] [address]\par

## 2020-06-02 NOTE — HISTORY OF PRESENT ILLNESS
[Currently Experiencing ___] :  [unfilled] [Urinary Incontinence] : urinary incontinence [Erectile Dysfunction] : Erectile Dysfunction [FreeTextEntry1] : Gaston is a 64-year-old male who we have been following for erectile dysfunction, status post RRP approximately 14 years ago.\par \par Currently he is being managed with formula #5 off Tri-mix however of late, he has been having diminished efficacy. He was using 0.1 mL and raised it to 0.15 and that is now working well\par \par He has history of significantly decreased testosterone levels however, he has history of prostate cancer, which according to his PSA study may be recurring. He is following with Dr. Serrano and a repeat PSA was requested in April but was not yet done. He will continue to follow up with Dr. Serrano regarding his BCR prostate cancer. He knows to get a PSA\par \par Additionally he has history of renal stones and was supposed to do a sono; he forgot\par \par He has bilateral groin dermatophytosis which is controlled with clotrimazole \par Finally with respect to his low T and biochemical recurrence wed ordered a DEXA, he does not know if done and we don’t have it\par \par Note on long rides he can have mild stress incontinence. when he does Kegel exercises it gets better

## 2020-06-25 ENCOUNTER — APPOINTMENT (OUTPATIENT)
Dept: UROLOGY | Facility: CLINIC | Age: 65
End: 2020-06-25

## 2020-07-23 ENCOUNTER — APPOINTMENT (OUTPATIENT)
Dept: UROLOGY | Facility: CLINIC | Age: 65
End: 2020-07-23

## 2020-08-17 ENCOUNTER — RX RENEWAL (OUTPATIENT)
Age: 65
End: 2020-08-17

## 2020-09-18 ENCOUNTER — RX RENEWAL (OUTPATIENT)
Age: 65
End: 2020-09-18

## 2021-04-28 ENCOUNTER — APPOINTMENT (OUTPATIENT)
Dept: CARDIOLOGY | Facility: CLINIC | Age: 66
End: 2021-04-28
Payer: MEDICARE

## 2021-04-28 VITALS
SYSTOLIC BLOOD PRESSURE: 150 MMHG | HEIGHT: 71 IN | BODY MASS INDEX: 42 KG/M2 | TEMPERATURE: 98.4 F | RESPIRATION RATE: 16 BRPM | HEART RATE: 75 BPM | OXYGEN SATURATION: 98 % | WEIGHT: 300 LBS | DIASTOLIC BLOOD PRESSURE: 90 MMHG

## 2021-04-28 PROCEDURE — 93000 ELECTROCARDIOGRAM COMPLETE: CPT

## 2021-04-28 PROCEDURE — 99213 OFFICE O/P EST LOW 20 MIN: CPT

## 2021-04-28 RX ORDER — LABETALOL HYDROCHLORIDE 200 MG/1
200 TABLET, FILM COATED ORAL
Qty: 180 | Refills: 0 | Status: DISCONTINUED | COMMUNITY
Start: 2017-06-14 | End: 2021-04-28

## 2021-04-28 RX ORDER — CLOTRIMAZOLE 10 MG/G
1 CREAM TOPICAL
Qty: 1 | Refills: 2 | Status: DISCONTINUED | COMMUNITY
Start: 2020-05-27 | End: 2021-04-28

## 2021-04-28 NOTE — PHYSICAL EXAM
[Well Developed] : well developed [Well Nourished] : well nourished [No Acute Distress] : no acute distress [Obese] : obese [Normal Conjunctiva] : normal conjunctiva [Normal Venous Pressure] : normal venous pressure [No Carotid Bruit] : no carotid bruit [Normal S1, S2] : normal S1, S2 [No Murmur] : no murmur [No Rub] : no rub [No Gallop] : no gallop [Clear Lung Fields] : clear lung fields [Good Air Entry] : good air entry [No Respiratory Distress] : no respiratory distress  [Soft] : abdomen soft [Non Tender] : non-tender [No Masses/organomegaly] : no masses/organomegaly [Normal Bowel Sounds] : normal bowel sounds [Normal Gait] : normal gait [No Edema] : no edema [No Cyanosis] : no cyanosis [No Clubbing] : no clubbing [No Varicosities] : no varicosities [No Rash] : no rash [No Skin Lesions] : no skin lesions [Moves all extremities] : moves all extremities [No Focal Deficits] : no focal deficits [Normal Speech] : normal speech [Alert and Oriented] : alert and oriented [Normal memory] : normal memory

## 2021-04-28 NOTE — ASSESSMENT
[FreeTextEntry1] : ## HTN\par ## Suspected Sleep Apnea\par \par - BP elevated today. States better at home\par - BP diary\par - Compliance, diet and exercise education Provided\par - Sleep Study\par - will sign off\par - General cardiology referral for further work-up and preventive cardiology.\par

## 2021-04-28 NOTE — REASON FOR VISIT
[FreeTextEntry1] : I had a pleasure of seeing Mr. TOMAS for follow-up consultation for hypertension. He was previously being followed by Dr. Deutsch.\par \par Mr. TOMAS is a 65 year-year old male with history of HTN is here for routine f-up.\par \par Denies chest pain, shortness of breath, palpitation, dizziness or LOC except noted above.\par + Snoring\par \par EKG: SR @75/min\par \par

## 2021-04-29 ENCOUNTER — APPOINTMENT (OUTPATIENT)
Dept: UROLOGY | Facility: CLINIC | Age: 66
End: 2021-04-29
Payer: MEDICARE

## 2021-04-29 VITALS
TEMPERATURE: 97.8 F | HEART RATE: 68 BPM | WEIGHT: 302 LBS | SYSTOLIC BLOOD PRESSURE: 156 MMHG | DIASTOLIC BLOOD PRESSURE: 93 MMHG | HEIGHT: 71 IN | BODY MASS INDEX: 42.28 KG/M2

## 2021-04-29 DIAGNOSIS — B35.6 TINEA CRURIS: ICD-10-CM

## 2021-04-29 DIAGNOSIS — N39.3 STRESS INCONTINENCE (FEMALE) (MALE): ICD-10-CM

## 2021-04-29 PROCEDURE — 99215 OFFICE O/P EST HI 40 MIN: CPT

## 2021-04-29 RX ORDER — ECONAZOLE NITRATE 10 MG/G
1 CREAM TOPICAL TWICE DAILY
Qty: 1 | Refills: 3 | Status: DISCONTINUED | COMMUNITY
Start: 2017-11-22 | End: 2021-04-29

## 2021-04-29 NOTE — LETTER HEADER
[FreeTextEntry3] : Mikhail Aparicio M.D.\par Director of Urology\par [facility]\par [address] [place] [address], [address]\[place] [address]

## 2021-04-29 NOTE — ASSESSMENT
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 Blanchard\par \par With respect to his erectile dysfunction I will renew the Trimix\par \par With respect to the incontinence he is status post surgery and given his symptoms a sphincter is not indicated he will live with the pads\par \par With respect to his kidney stones we will order a renal ultrasound we will then come in to review

## 2021-04-29 NOTE — ADDENDUM
[FreeTextEntry1] : Please note he tells me he is now up to 0.2 patch 0.25 I do not know of its course its old or his penis is getting worse but I told him to drop down to 0.05 to start and then raise the dose as an if needed and tolerated

## 2021-04-29 NOTE — REVIEW OF SYSTEMS
[see HPI] : see HPI [Joint Pain] : joint pain [Itching] : itching [Erectile Dysfunction] : erectile dysfunction [Negative] : Heme/Lymph

## 2021-04-29 NOTE — HISTORY OF PRESENT ILLNESS
[Currently Experiencing ___] :  [unfilled] [Urinary Incontinence] : urinary incontinence [Urinary Urgency] : urinary urgency [Urinary Frequency] : urinary frequency [Nocturia] : nocturia [Erectile Dysfunction] : Erectile Dysfunction [FreeTextEntry1] : Gaston is a 65-year-old male we have been following for many many many years including initially a radical prostatectomy which he did with Dr. Wilson this was followed by a self injection program which worked well for many years he then developed low testosterone which we are in the process of considering treatment but his PSA has been going up the last time we checked it in July 2019 it was up to 1.34.  He was last seen May 27, 2020 during the Covid pandemic with positive testing and really did nothing.  He was self quarantining hoping to avoid getting sick and dying he is put on only about 10 pounds but he was ready massively obese before he is now over 300 with a BMI of 42.  Either has to grow about a foot or lose about 100 pounds.\par \par He has not yet been vaccinated as he wants to wait till almost everybody else takes it to make sure there is no problem and he and I discussed how the risk of Covid is a higher risk than the side effects of the vaccination at least as we understand it today.\par \par He came in today as he wants to once again start taking care of himself.\par \par He has been using Trimix using formula #5 of 30/1/10 using very small amounts up to 0.05 he still has somewhat from last year and if that still working he really does not need a lot he uses last 2 weeks ago.  New problem is even though he stays rigid for a while and can have penis vaginal contact his wife is developing a friction burn and he is still not yet with orgasm.  Even when here she tries to do it with masturbation he has a lot of trouble when he finally reconsidered obviously no fluid comes out but the sense of pleasure is really just the physical there is no more a Mani candle.\par \par He has a small amount of leak as his bladder fills once he gets to the point that he is really full he can hold it.  He treats this with timed voiding for example if he is going for long car drive he will urinate before he goes.\par \par As far as kidney stones he is close to doing ultrasound has not been he has not had colic.  He would like to check this out\par \par He also has groin dermatophytosis presume related to his obesity he uses Chlortrimazole for that it is coming back and he needs some more [Straining] : no straining [Weak Stream] : no weak stream [Dysuria] : no dysuria [Hematuria - Gross] : no gross hematuria

## 2021-04-29 NOTE — PHYSICAL EXAM
[General Appearance - Well Developed] : well developed [General Appearance - Well Nourished] : well nourished [Normal Appearance] : normal appearance [Well Groomed] : well groomed [General Appearance - In No Acute Distress] : no acute distress [Abdomen Soft] : soft [Abdomen Tenderness] : non-tender [Abdomen Hernia] : no hernia was discovered [Costovertebral Angle Tenderness] : no ~M costovertebral angle tenderness [Urethral Meatus] : meatus normal [Penis Abnormality] : normal uncircumcised penis [Epididymis] : the epididymides were normal [Testes Tenderness] : no tenderness of the testes [Testes Mass (___cm)] : there were no testicular masses [Heart Rate And Rhythm] : Heart rate and rhythm were normal [] : no respiratory distress [Respiration, Rhythm And Depth] : normal respiratory rhythm and effort [Exaggerated Use Of Accessory Muscles For Inspiration] : no accessory muscle use [Auscultation Breath Sounds / Voice Sounds] : lungs were clear to auscultation bilaterally [Oriented To Time, Place, And Person] : oriented to person, place, and time [Affect] : the affect was normal [Mood] : the mood was normal [Not Anxious] : not anxious [Normal Station and Gait] : the gait and station were normal for the patient's age [FreeTextEntry1] : DTR's & BC reflexes were intact

## 2021-04-29 NOTE — LETTER BODY
[Dear  ___] : Dear  [unfilled], [Courtesy Letter:] : I had the pleasure of seeing your patient, [unfilled], in my office today. [Please see my note below.] : Please see my note below. [Sincerely,] : Sincerely, [FreeTextEntry2] : Margaret Pride MD\par [address] [place] [address]\[place] [address]\par

## 2021-05-03 ENCOUNTER — RESULT CHARGE (OUTPATIENT)
Age: 66
End: 2021-05-03

## 2021-05-03 ENCOUNTER — APPOINTMENT (OUTPATIENT)
Dept: CARDIOLOGY | Facility: CLINIC | Age: 66
End: 2021-05-03
Payer: MEDICARE

## 2021-05-03 VITALS
WEIGHT: 301 LBS | SYSTOLIC BLOOD PRESSURE: 130 MMHG | DIASTOLIC BLOOD PRESSURE: 80 MMHG | HEART RATE: 73 BPM | BODY MASS INDEX: 42.14 KG/M2 | TEMPERATURE: 97.1 F | HEIGHT: 71 IN

## 2021-05-03 DIAGNOSIS — R06.00 DYSPNEA, UNSPECIFIED: ICD-10-CM

## 2021-05-03 LAB
BASOPHILS # BLD AUTO: 0.02 K/UL
BASOPHILS NFR BLD AUTO: 0.4 %
EOSINOPHIL # BLD AUTO: 0.11 K/UL
EOSINOPHIL NFR BLD AUTO: 2.4 %
HCT VFR BLD CALC: 44.2 %
HGB BLD-MCNC: 14.5 G/DL
IMM GRANULOCYTES NFR BLD AUTO: 0.2 %
LYMPHOCYTES # BLD AUTO: 2.29 K/UL
LYMPHOCYTES NFR BLD AUTO: 50.9 %
MAN DIFF?: NORMAL
MCHC RBC-ENTMCNC: 29.7 PG
MCHC RBC-ENTMCNC: 32.8 G/DL
MCV RBC AUTO: 90.4 FL
MONOCYTES # BLD AUTO: 0.31 K/UL
MONOCYTES NFR BLD AUTO: 6.9 %
NEUTROPHILS # BLD AUTO: 1.76 K/UL
NEUTROPHILS NFR BLD AUTO: 39.2 %
PLATELET # BLD AUTO: 263 K/UL
RBC # BLD: 4.89 M/UL
RBC # FLD: 13.2 %
WBC # FLD AUTO: 4.5 K/UL

## 2021-05-03 PROCEDURE — 93000 ELECTROCARDIOGRAM COMPLETE: CPT

## 2021-05-03 PROCEDURE — 99215 OFFICE O/P EST HI 40 MIN: CPT

## 2021-05-05 LAB
ALBUMIN SERPL ELPH-MCNC: 4.3 G/DL
ALP BLD-CCNC: 70 U/L
ALT SERPL-CCNC: 30 U/L
AST SERPL-CCNC: 21 U/L
BILIRUB DIRECT SERPL-MCNC: <0.2 MG/DL
BILIRUB INDIRECT SERPL-MCNC: >0.3 MG/DL
BILIRUB SERPL-MCNC: 0.5 MG/DL
ESTRADIOL SERPL-MCNC: 14 PG/ML
LH SERPL-ACNC: 5.5 IU/L
PROT SERPL-MCNC: 8 G/DL

## 2021-05-06 LAB — SHBG SERPL-SCNC: 36.5 NMOL/L

## 2021-05-07 LAB
PSA FREE FLD-MCNC: 2 %
PSA FREE SERPL-MCNC: 0.03 NG/ML
PSA SERPL-MCNC: 2 NG/ML

## 2021-05-10 LAB
% FREE TESTOSTERONE - ESO: 1.2 %
FREE TESTOSTERONE - ESO: 30 PG/ML
SHBG-ESOTERIX: 43.8 NMOL/L
TESTOSTERONE SERUM - ESO: 253 NG/DL

## 2021-05-12 ENCOUNTER — APPOINTMENT (OUTPATIENT)
Dept: CARDIOLOGY | Facility: CLINIC | Age: 66
End: 2021-05-12
Payer: MEDICARE

## 2021-05-12 PROCEDURE — 93306 TTE W/DOPPLER COMPLETE: CPT

## 2021-05-16 ENCOUNTER — OUTPATIENT (OUTPATIENT)
Dept: OUTPATIENT SERVICES | Facility: HOSPITAL | Age: 66
LOS: 1 days | Discharge: HOME | End: 2021-05-16
Payer: MEDICARE

## 2021-05-16 ENCOUNTER — RESULT REVIEW (OUTPATIENT)
Age: 66
End: 2021-05-16

## 2021-05-16 DIAGNOSIS — N20.0 CALCULUS OF KIDNEY: ICD-10-CM

## 2021-05-16 PROCEDURE — 76775 US EXAM ABDO BACK WALL LIM: CPT | Mod: 26

## 2021-05-17 ENCOUNTER — APPOINTMENT (OUTPATIENT)
Dept: UROLOGY | Facility: CLINIC | Age: 66
End: 2021-05-17
Payer: MEDICARE

## 2021-05-17 PROCEDURE — 99214 OFFICE O/P EST MOD 30 MIN: CPT

## 2021-05-21 ENCOUNTER — NON-APPOINTMENT (OUTPATIENT)
Age: 66
End: 2021-05-21

## 2021-05-21 LAB
ALBUMIN SERPL ELPH-MCNC: 4.3 G/DL
ALP BLD-CCNC: 67 U/L
ALT SERPL-CCNC: 26 U/L
ANION GAP SERPL CALC-SCNC: 9 MMOL/L
AST SERPL-CCNC: 18 U/L
BASOPHILS # BLD AUTO: 0.03 K/UL
BASOPHILS NFR BLD AUTO: 0.7 %
BILIRUB SERPL-MCNC: 0.7 MG/DL
BUN SERPL-MCNC: 7 MG/DL
CALCIUM SERPL-MCNC: 9.4 MG/DL
CHLORIDE SERPL-SCNC: 103 MMOL/L
CHOLEST SERPL-MCNC: 202 MG/DL
CO2 SERPL-SCNC: 29 MMOL/L
CREAT SERPL-MCNC: 1.2 MG/DL
EOSINOPHIL # BLD AUTO: 0.16 K/UL
EOSINOPHIL NFR BLD AUTO: 3.5 %
ESTIMATED AVERAGE GLUCOSE: 114 MG/DL
GLUCOSE SERPL-MCNC: 102 MG/DL
HBA1C MFR BLD HPLC: 5.6 %
HCT VFR BLD CALC: 44.4 %
HDLC SERPL-MCNC: 45 MG/DL
HGB BLD-MCNC: 14.4 G/DL
IMM GRANULOCYTES NFR BLD AUTO: 0.2 %
LDLC SERPL CALC-MCNC: 142 MG/DL
LYMPHOCYTES # BLD AUTO: 2.44 K/UL
LYMPHOCYTES NFR BLD AUTO: 53.4 %
MAN DIFF?: NORMAL
MCHC RBC-ENTMCNC: 29.4 PG
MCHC RBC-ENTMCNC: 32.4 G/DL
MCV RBC AUTO: 90.8 FL
MONOCYTES # BLD AUTO: 0.47 K/UL
MONOCYTES NFR BLD AUTO: 10.3 %
NEUTROPHILS # BLD AUTO: 1.46 K/UL
NEUTROPHILS NFR BLD AUTO: 31.9 %
NONHDLC SERPL-MCNC: 157 MG/DL
PLATELET # BLD AUTO: 246 K/UL
POTASSIUM SERPL-SCNC: 4.4 MMOL/L
PROT SERPL-MCNC: 7.6 G/DL
RBC # BLD: 4.89 M/UL
RBC # FLD: 13.3 %
SODIUM SERPL-SCNC: 141 MMOL/L
TRIGL SERPL-MCNC: 92 MG/DL
TSH SERPL-ACNC: 2.24 UIU/ML
WBC # FLD AUTO: 4.57 K/UL

## 2021-05-21 NOTE — HISTORY OF PRESENT ILLNESS
[None] : None [FreeTextEntry1] : PIPE TOMAS is a 65 year old male with a history of prostate cancer, Abdirizak 7(3 + 4) , s/p RRP in 2005.\par He has a history of biochemical recurrence, with increasing PSA, since 2018.  Bone scan, at that time, was negative for recurrence.\par \par In 2019 his PSA was 1.34 and maximum study was ordered.  Study revealed abnormal uptake of axumin, visually greater than blood pool activity, in bilateral inguinal nodes which measure up to 2.4 x 1.5 cm on the left; this finding is nonspecific, however may reflect tumor activity in the setting of rising PSA.  No additional foci of abnormal axumin uptake. Few bilobar hepatic cysts. Nonobstructing right renal lower pole calculi. Left renal interpolar cyst. \par \par His most recent PSA, from 5/7/2021, is 2.0.  Patient feeling well denies any bone pain, changes in neurological status, or weight loss.\par \par Patient has history of ED and stones, managed by .  Recent ultrasound demonstrates a 7 x 6 mm nonobstructing right stone; will follow up with  for further discussion.

## 2021-05-21 NOTE — ADDENDUM
[FreeTextEntry1] : Documented by Sylvester Rangel acting as a scribe for Dr. Lennox Serrano \par \par All medical record entries made by the Scribe were at my,  direction and\par personally dictated by me.  I have reviewed the chart and agree that the record\par accurately reflects my personal performance of the history, physical exam, procedure and imaging.  \par  \par \par

## 2021-05-21 NOTE — ASSESSMENT
[FreeTextEntry1] : PIPE TOMAS is a 65 year old male with a history of prostate cancer, Abdirizak 7(3 + 4) , s/p RRP in 2005.\par He has a history of biochemical recurrence, with increasing PSA, since 2018. PSA now is 2.0.\par \par \par - RPT axumin study with bone scan\par - F/u to review

## 2021-05-29 ENCOUNTER — LABORATORY RESULT (OUTPATIENT)
Age: 66
End: 2021-05-29

## 2021-05-29 ENCOUNTER — OUTPATIENT (OUTPATIENT)
Dept: OUTPATIENT SERVICES | Facility: HOSPITAL | Age: 66
LOS: 1 days | Discharge: HOME | End: 2021-05-29

## 2021-05-30 DIAGNOSIS — Z11.59 ENCOUNTER FOR SCREENING FOR OTHER VIRAL DISEASES: ICD-10-CM

## 2021-06-01 ENCOUNTER — OUTPATIENT (OUTPATIENT)
Dept: OUTPATIENT SERVICES | Facility: HOSPITAL | Age: 66
LOS: 1 days | Discharge: HOME | End: 2021-06-01
Payer: MEDICARE

## 2021-06-01 DIAGNOSIS — C61 MALIGNANT NEOPLASM OF PROSTATE: ICD-10-CM

## 2021-06-02 ENCOUNTER — RESULT REVIEW (OUTPATIENT)
Age: 66
End: 2021-06-02

## 2021-06-02 ENCOUNTER — APPOINTMENT (OUTPATIENT)
Age: 66
End: 2021-06-02
Payer: MEDICARE

## 2021-06-02 VITALS
RESPIRATION RATE: 14 BRPM | DIASTOLIC BLOOD PRESSURE: 86 MMHG | OXYGEN SATURATION: 97 % | HEART RATE: 89 BPM | SYSTOLIC BLOOD PRESSURE: 136 MMHG | WEIGHT: 305 LBS | HEIGHT: 71 IN | BODY MASS INDEX: 42.7 KG/M2

## 2021-06-02 DIAGNOSIS — G47.33 OBSTRUCTIVE SLEEP APNEA (ADULT) (PEDIATRIC): ICD-10-CM

## 2021-06-02 PROCEDURE — G0447 BEHAVIOR COUNSEL OBESITY 15M: CPT | Mod: 59

## 2021-06-02 PROCEDURE — 78306 BONE IMAGING WHOLE BODY: CPT | Mod: 26,MH

## 2021-06-02 PROCEDURE — 71046 X-RAY EXAM CHEST 2 VIEWS: CPT

## 2021-06-02 PROCEDURE — 99203 OFFICE O/P NEW LOW 30 MIN: CPT | Mod: 25

## 2021-06-02 PROCEDURE — 78803 RP LOCLZJ TUM SPECT 1 AREA: CPT | Mod: 26,MH

## 2021-06-02 NOTE — COUNSELING
[Potential consequences of obesity discussed] : Potential consequences of obesity discussed [Good understanding] : Patient has a good understanding of disease, goals and obesity follow-up plan [FreeTextEntry4] : 15

## 2021-06-02 NOTE — HISTORY OF PRESENT ILLNESS
[Initial Evaluation] : an initial evaluation of [Snoring] : snoring [Excessive Daytime Sleepiness] : excessive daytime sleepiness [Unrefreshing Sleep] : unrefreshing sleep [Currently Experiencing] : The patient is currently experiencing symptoms. [None] : No associated symptoms are reported [Good Sleep Hygiene] : good sleep hygiene [Shortness of Breath] : Shortness of Breath

## 2021-06-02 NOTE — PROCEDURE
[FreeTextEntry1] : CXR PA and Lateral \par The costophrenic and cardiophrenic angles are sharp\par The saman parenchyma shows no infiltrates, consolidations, or nodules \par The Mediastinum is within normal limits\par No pleural effusions\par

## 2021-06-04 ENCOUNTER — APPOINTMENT (OUTPATIENT)
Dept: CARDIOLOGY | Facility: CLINIC | Age: 66
End: 2021-06-04
Payer: MEDICARE

## 2021-06-04 PROCEDURE — 93015 CV STRESS TEST SUPVJ I&R: CPT

## 2021-06-08 ENCOUNTER — APPOINTMENT (OUTPATIENT)
Dept: CARDIOLOGY | Facility: CLINIC | Age: 66
End: 2021-06-08
Payer: MEDICARE

## 2021-06-08 DIAGNOSIS — R60.0 LOCALIZED EDEMA: ICD-10-CM

## 2021-06-08 PROCEDURE — 93970 EXTREMITY STUDY: CPT

## 2021-06-16 ENCOUNTER — APPOINTMENT (OUTPATIENT)
Dept: UROLOGY | Facility: CLINIC | Age: 66
End: 2021-06-16
Payer: MEDICARE

## 2021-06-16 VITALS
HEIGHT: 71 IN | BODY MASS INDEX: 28.56 KG/M2 | HEART RATE: 78 BPM | WEIGHT: 204 LBS | DIASTOLIC BLOOD PRESSURE: 81 MMHG | TEMPERATURE: 97.9 F | SYSTOLIC BLOOD PRESSURE: 129 MMHG

## 2021-06-16 VITALS — BODY MASS INDEX: 42.4 KG/M2 | WEIGHT: 304 LBS

## 2021-06-16 DIAGNOSIS — N28.1 CYST OF KIDNEY, ACQUIRED: ICD-10-CM

## 2021-06-16 PROCEDURE — 99213 OFFICE O/P EST LOW 20 MIN: CPT

## 2021-06-16 NOTE — LETTER HEADER
[FreeTextEntry3] : Mikhail Aparicio M.D.\par Director of Urology\par [facility]\par [address] [place] [address], [address]\[place] [address]

## 2021-06-16 NOTE — ASSESSMENT
[FreeTextEntry1] : From the stone aspect he is got a 6 to 7 mm right-sided stone which is stableA left-sided cyst which appears benign\par \par With respect to his erectile dysfunction he is doing well with Trimix I do not see a need to repeat the exam today when he runs that he will call me\par \par From the viewpoint of his prostate cancer and I see if the staff could find out why his Auxilium study is being held up and he will follow-up with Dr. Serrano

## 2021-06-16 NOTE — HISTORY OF PRESENT ILLNESS
[FreeTextEntry1] : Gaston is a 65-year-old -American male with multiple urologic issues.  I am following him for his kidney stones and erectile dysfunction.  He was sent for a renal ultrasound which was done in May and he is using Trimix 0.1 mL.  I sent her for subspecialty consultation request he appears to have a biochemical recurrence a bone scan was done, the Auxilium study is still pending please note in the interim he saw the pulmonologist and is scheduled for sleep apnea study presumably if nothing else aggravated by his weight with his BMI now up to 42 about the same as it was in June

## 2021-06-16 NOTE — PHYSICAL EXAM
[General Appearance - Well Developed] : well developed [General Appearance - Well Nourished] : well nourished [Normal Appearance] : normal appearance [Well Groomed] : well groomed [General Appearance - In No Acute Distress] : no acute distress [FreeTextEntry1] : obese even more than before [] : no respiratory distress [Respiration, Rhythm And Depth] : normal respiratory rhythm and effort [Exaggerated Use Of Accessory Muscles For Inspiration] : no accessory muscle use [Auscultation Breath Sounds / Voice Sounds] : lungs were clear to auscultation bilaterally [Oriented To Time, Place, And Person] : oriented to person, place, and time [Affect] : the affect was normal [Mood] : the mood was normal [Not Anxious] : not anxious [Normal Station and Gait] : the gait and station were normal for the patient's age

## 2021-06-16 NOTE — LETTER BODY
[Dear  ___] : Dear  [unfilled], [Courtesy Letter:] : I had the pleasure of seeing your patient, [unfilled], in my office today. [Please see my note below.] : Please see my note below. [Sincerely,] : Sincerely, [FreeTextEntry2] : Margaret Pride MD\par [address] [place] [address]\[place] [address]\par

## 2021-06-17 ENCOUNTER — APPOINTMENT (OUTPATIENT)
Dept: UROLOGY | Facility: CLINIC | Age: 66
End: 2021-06-17

## 2021-06-28 ENCOUNTER — APPOINTMENT (OUTPATIENT)
Dept: CARDIOLOGY | Facility: CLINIC | Age: 66
End: 2021-06-28
Payer: MEDICARE

## 2021-06-28 DIAGNOSIS — I77.810 THORACIC AORTIC ECTASIA: ICD-10-CM

## 2021-06-28 DIAGNOSIS — R06.02 SHORTNESS OF BREATH: ICD-10-CM

## 2021-06-28 PROCEDURE — 93325 DOPPLER ECHO COLOR FLOW MAPG: CPT

## 2021-06-28 PROCEDURE — 93320 DOPPLER ECHO COMPLETE: CPT

## 2021-06-28 PROCEDURE — 93351 STRESS TTE COMPLETE: CPT

## 2021-06-29 PROBLEM — R06.02 SHORTNESS OF BREATH: Status: ACTIVE | Noted: 2021-06-02

## 2021-06-29 PROBLEM — I77.810 ASCENDING AORTA DILATION: Status: ACTIVE | Noted: 2021-05-21

## 2021-07-14 ENCOUNTER — APPOINTMENT (OUTPATIENT)
Age: 66
End: 2021-07-14

## 2021-07-16 ENCOUNTER — OUTPATIENT (OUTPATIENT)
Dept: OUTPATIENT SERVICES | Facility: HOSPITAL | Age: 66
LOS: 1 days | Discharge: HOME | End: 2021-07-16

## 2021-07-16 DIAGNOSIS — Z11.59 ENCOUNTER FOR SCREENING FOR OTHER VIRAL DISEASES: ICD-10-CM

## 2021-07-19 ENCOUNTER — OUTPATIENT (OUTPATIENT)
Dept: OUTPATIENT SERVICES | Facility: HOSPITAL | Age: 66
LOS: 1 days | Discharge: HOME | End: 2021-07-19
Payer: MEDICARE

## 2021-07-19 ENCOUNTER — RESULT REVIEW (OUTPATIENT)
Age: 66
End: 2021-07-19

## 2021-07-19 ENCOUNTER — TRANSCRIPTION ENCOUNTER (OUTPATIENT)
Age: 66
End: 2021-07-19

## 2021-07-19 VITALS
HEART RATE: 72 BPM | DIASTOLIC BLOOD PRESSURE: 95 MMHG | HEIGHT: 71 IN | WEIGHT: 300.05 LBS | TEMPERATURE: 98 F | SYSTOLIC BLOOD PRESSURE: 144 MMHG | RESPIRATION RATE: 18 BRPM

## 2021-07-19 VITALS — HEART RATE: 72 BPM | SYSTOLIC BLOOD PRESSURE: 130 MMHG | RESPIRATION RATE: 18 BRPM | DIASTOLIC BLOOD PRESSURE: 83 MMHG

## 2021-07-19 DIAGNOSIS — Z90.79 ACQUIRED ABSENCE OF OTHER GENITAL ORGAN(S): Chronic | ICD-10-CM

## 2021-07-19 PROCEDURE — 88305 TISSUE EXAM BY PATHOLOGIST: CPT | Mod: 26

## 2021-07-19 NOTE — ASU DISCHARGE PLAN (ADULT/PEDIATRIC) - FOLLOW UP APPOINTMENTS
[facility]    [address] [facility], [place] [address]    Phone:  [phone]    Fax:  [phone]                                       Sunny Samaniego   MRN: [id number]    Department:  [facility]   Date of Visit:  5/18/2018           Patient Information     Date Of Birth          1964        Your diagnoses for this visit were:     Right eye injury, initial encounter     Pain of toe of right foot        You were seen by Chirag Ortiz MD.      Follow-up Information     Follow up with Talia Bhandari MD.    Specialty:  Family Practice    Why:  Next week for recheck    Contact information:    [address] [facility] [address]  [phone]          Follow up with [facility].    Specialty:  EMERGENCY MEDICINE    Why:  If symptoms worsen    Contact information:    [address] [facility] [address] [phone]    Additional information:    The medical center is located at   [address] [facility]. (between [address] in [place], [place] miles north   of [place]).        Discharge Instructions       Return if symptoms worsen or new symptoms develop.  Follow-up with primary care physician next available.  Drink plenty of fluids.  If any visual changes eye pain or other symptoms present please return for recheck.  He did not feel a x-ray of your toe was necessary at this time.  Buddy tape toe as needed.  Take your pain medication as directed.  Toe Sprain  A sprain is a stretching or tearing of the ligaments that hold a joint together. There are no broken bones. Sprains generally take from 3-6 weeks to heal. A toe sprain may be treated by taping the injured toe to the next toe. This is called buddy taping. This protects the injured toe and holds it in position. Mild sprains may not need any additional support. If the toenail has been hurt badly, it may fall off in 1-2 weeks. A new one will usually  start to grow back within a month.     Home care    Keep your leg elevated when sitting or lying down. This is very important during the first 48 hours to reduce swelling. Stay off the injured foot as much as possible until you can walk on it without pain. If needed, you may use crutches during the first week for this purpose. Crutches can be rented at many pharmacies or surgical/orthopedic supply stores.    You may be given a cast shoe to wear to prevent movement in your toe. If not, you can use a sandal or any shoe that does not put pressure on the injured toe until the swelling and pain go away. If using a sandal, be careful not to hit your foot against anything, since another injury could make the sprain worse.    Apply an ice pack over the injured area for 15 to 20 minutes every 3 to 6 hours. You should do this for the first 24 to 48 hours. You can make an ice pack by filling a plastic bag that seals at the top with ice cubes and then wrapping it with a thin towel. Continue to use ice packs for relief of pain and swelling as needed. As the ice melts, be careful to avoid getting your wrap, splint, or cast wet. As the ice melts, be careful to avoid getting any wrap, splint, tape, or cast wet. After 48 hours, apply heat from a warm shower or bath for 20 minutes several times daily. Alternating ice and heat may also be helpful.    If buddy tape was applied and it becomes wet or dirty, change it. You may replace it with paper, plastic or cloth tape. Cloth tape and paper tapes must be kept dry. Apply gauze or cotton padding between the toes, especially near webbed area. This will help prevent the skin from getting moist and breaking down. Keep the buddy tape in place for at least 4 weeks, or as advised by your healthcare provider.    You may use over-the-counter pain medicine to control pain, unless another pain medicine was prescribed. If you have chronic liver or kidney disease or ever had a stomach ulcer or GI  bleeding, talk with your healthcare provider before using these medicines.    You may return to sports after healing, when you can run without pain.  Follow-up care  Follow up with your with your healthcare provider as advised. Sometimes fractures don t show up on the first X-ray. Bruises and sprains can sometimes hurt as much as a fracture. These injuries can take time to heal completely. If your symptoms don t improve or they get worse, talk with your healthcare provider. You may need a repeat X-ray. If X-rays were taken, you will be told of any new findings that may affect your care.  When to seek medical advice  Call your healthcare provider right away if any of these occur:    Redness, warmth, or fluid drainage from your toe    Pain or swelling increases    Toes become cold, blue, numb, or tingly  Date Last Reviewed: 11/20/2015 2000-2017 The [employer]. [address] [place] [address]. All rights reserved. This information is not intended as a substitute for professional medical care. Always follow your healthcare professional's instructions.          Discharge References/Attachments     (S) GOING HOME WITH AN EYE INJURY (ENGLISH)      24 Hour Appointment Hotline       To make an appointment at any [facility] clinic, call [phone] ([phone]). If you don't have a family doctor or clinic, we will help you find one. [facility] clinics are conveniently located to serve the needs of you and your family.             Review of your medicines      Our records show that you are taking the medicines listed below. If these are incorrect, please call your family doctor or clinic.        Dose / Directions Last dose taken    atenolol 100 MG tablet   Commonly known as:  TENORMIN   Dose:  100 mg   Quantity:  30 tablet        Take 1 tablet (100 mg) by mouth daily   Refills:  1        cetirizine 10 MG tablet   Commonly known as:  zyrTEC   Dose:  10 mg   Quantity:  30 tablet        Take 1 tablet  (10 mg) by mouth every evening   Refills:  1        HYDROcodone-acetaminophen 7.5-325 MG per tablet   Commonly known as:  NORCO   Dose:  1 tablet   Quantity:  60 tablet        Take 1 tablet by mouth every 8 hours as needed for moderate to severe pain   Refills:  0        IBUPROFEN PO   Dose:  400 mg        Take 400 mg by mouth every 6 hours as needed for moderate pain   Refills:  0        losartan-hydrochlorothiazide 50-12.5 MG per tablet   Commonly known as:  HYZAAR   Dose:  1 tablet   Quantity:  90 tablet        Take 1 tablet by mouth daily   Refills:  3        NEXIUM PO   Dose:  20 mg        Take 20 mg by mouth daily   Refills:  0        pregabalin 50 MG capsule   Commonly known as:  LYRICA   Dose:  50 mg   Quantity:  90 capsule        Take 1 capsule (50 mg) by mouth 3 times daily   Refills:  1                Orders Needing Specimen Collection     None      Pending Results     No orders found from 5/16/2018 to 5/19/2018.            Pending Culture Results     No orders found from 5/16/2018 to 5/19/2018.            Pending Results Instructions     If you had any lab results that were not finalized at the time of your Discharge, you can call the ED Lab Result RN at [phone]. You will be contacted by this team for any positive Lab results or changes in treatment. The nurses are available 7 days a week from 10A to 6:30P.  You can leave a message 24 hours per day and they will return your call.        Test Results From Your Hospital Stay        5/18/2018  3:32 PM                Thank you for choosing [facility]       Thank you for choosing [facility] for your care. Our goal is always to provide you with excellent care. Hearing back from our patients is one way we can continue to improve our services. Please take a few minutes to complete the written survey that you may receive in the mail after you visit with us. Thank you!        [employer] [facility] Information     [employer] gives you secure access to your electronic health  935 record. If you see a primary care provider, you can also send messages to your care team and make appointments. If you have questions, please call your primary care clinic.  If you do not have a primary care provider, please call [phone] and they will assist you.        Care EveryWhere ID     This is your Care EveryWhere ID. This could be used by other organizations to access your [facility] medical records  [id number]        Equal Access to Services     MARY MORSE : Tona Cordon, jorge ko, lisha cherry, shannon cassidy. So [facility] [phone].    ATENCIÓN: Si habla español, tiene a lozada disposición servicios gratuitos de asistencia lingüística. Llame al [phone].    We comply with applicable federal civil rights laws and [place] laws. We do not discriminate on the basis of race, color, national origin, age, disability, sex, sexual orientation, or gender identity.            After Visit Summary       This is your record. Keep this with you and show to your community pharmacist(s) and doctor(s) at your next visit.

## 2021-07-19 NOTE — ASU DISCHARGE PLAN (ADULT/PEDIATRIC) - CALL YOUR DOCTOR IF YOU HAVE ANY OF THE FOLLOWING:
Bleeding that does not stop/Pain not relieved by Medications/Fever greater than (need to indicate Fahrenheit or Celsius)/Wound/Surgical Site with redness, or foul smelling discharge or pus/Numbness, tingling, color or temperature change to extremity/Nausea and vomiting that does not stop/Unable to urinate/Excessive diarrhea/Inability to tolerate liquids or foods/Increased irritability or sluggishness

## 2021-07-19 NOTE — CHART NOTE - NSCHARTNOTEFT_GEN_A_CORE
PACU ANESTHESIA ADMISSION NOTE      Procedure:   Post op diagnosis:      ____  Intubated  TV:______       Rate: ______      FiO2: ______    __x__  Patent Airway    __x__  Full return of protective reflexes    __x__  Full recovery from anesthesia / back to baseline     Vitals:   T:    97.9       R:        14          BP:      133/77            Sat:  97%                 P: 75      Mental Status:  __x__ Awake   __x___ Alert   _____ Drowsy   _____ Sedated    Nausea/Vomiting:  __x__ NO  ______Yes,   See Post - Op Orders          Pain Scale (0-10):  __0___    Treatment: ____ None    ___x_ See Post - Op/PCA Orders    Post - Operative Fluids:   ____ Oral   __x__ See Post - Op Orders    Plan: Discharge:   __x__Home       _____Floor     _____Critical Care    _____  Other:_________________    Comments:  pt tolerated procedure well, pt transferred to PACU and report was given to PACU RN, vital signs are stable and pt shows no signs of distress. no anesthesia complications, discharge pt when criteria met.

## 2021-07-19 NOTE — PRE-ANESTHESIA EVALUATION ADULT - NSANTHOSAYNRD_GEN_A_CORE
No. HERNANDEZ screening performed.  STOP BANG Legend: 0-2 = LOW Risk; 3-4 = INTERMEDIATE Risk; 5-8 = HIGH Risk

## 2021-07-20 PROBLEM — I10 ESSENTIAL (PRIMARY) HYPERTENSION: Chronic | Status: ACTIVE | Noted: 2021-07-19

## 2021-07-20 LAB — SURGICAL PATHOLOGY STUDY: SIGNIFICANT CHANGE UP

## 2021-07-21 DIAGNOSIS — K57.90 DIVERTICULOSIS OF INTESTINE, PART UNSPECIFIED, WITHOUT PERFORATION OR ABSCESS WITHOUT BLEEDING: ICD-10-CM

## 2021-07-21 DIAGNOSIS — I10 ESSENTIAL (PRIMARY) HYPERTENSION: ICD-10-CM

## 2021-07-21 DIAGNOSIS — K64.4 RESIDUAL HEMORRHOIDAL SKIN TAGS: ICD-10-CM

## 2021-07-21 DIAGNOSIS — Z12.11 ENCOUNTER FOR SCREENING FOR MALIGNANT NEOPLASM OF COLON: ICD-10-CM

## 2021-07-21 DIAGNOSIS — D12.8 BENIGN NEOPLASM OF RECTUM: ICD-10-CM

## 2021-07-21 DIAGNOSIS — K64.8 OTHER HEMORRHOIDS: ICD-10-CM

## 2021-07-23 ENCOUNTER — APPOINTMENT (OUTPATIENT)
Dept: UROLOGY | Facility: CLINIC | Age: 66
End: 2021-07-23

## 2021-07-26 ENCOUNTER — RESULT REVIEW (OUTPATIENT)
Age: 66
End: 2021-07-26

## 2021-07-26 ENCOUNTER — OUTPATIENT (OUTPATIENT)
Dept: OUTPATIENT SERVICES | Facility: HOSPITAL | Age: 66
LOS: 1 days | Discharge: HOME | End: 2021-07-26
Payer: MEDICARE

## 2021-07-26 DIAGNOSIS — C61 MALIGNANT NEOPLASM OF PROSTATE: ICD-10-CM

## 2021-07-26 DIAGNOSIS — Z90.79 ACQUIRED ABSENCE OF OTHER GENITAL ORGAN(S): Chronic | ICD-10-CM

## 2021-07-26 LAB — GLUCOSE BLDC GLUCOMTR-MCNC: 109 MG/DL — HIGH (ref 70–99)

## 2021-07-26 PROCEDURE — 78815 PET IMAGE W/CT SKULL-THIGH: CPT | Mod: 26,PS,MH

## 2021-07-27 ENCOUNTER — APPOINTMENT (OUTPATIENT)
Dept: CARDIOLOGY | Facility: CLINIC | Age: 66
End: 2021-07-27
Payer: MEDICARE

## 2021-07-27 VITALS
DIASTOLIC BLOOD PRESSURE: 92 MMHG | BODY MASS INDEX: 42.7 KG/M2 | SYSTOLIC BLOOD PRESSURE: 144 MMHG | HEIGHT: 71 IN | TEMPERATURE: 97.6 F | WEIGHT: 305 LBS

## 2021-07-27 VITALS — SYSTOLIC BLOOD PRESSURE: 130 MMHG | DIASTOLIC BLOOD PRESSURE: 80 MMHG

## 2021-07-27 DIAGNOSIS — E78.5 HYPERLIPIDEMIA, UNSPECIFIED: ICD-10-CM

## 2021-07-27 DIAGNOSIS — I10 ESSENTIAL (PRIMARY) HYPERTENSION: ICD-10-CM

## 2021-07-27 PROCEDURE — 93000 ELECTROCARDIOGRAM COMPLETE: CPT

## 2021-07-27 PROCEDURE — 99213 OFFICE O/P EST LOW 20 MIN: CPT

## 2021-07-27 NOTE — ASSESSMENT
[FreeTextEntry1] :  kylee k g nsr no change\par  bp 130/80 well controlled\par  advised to loose weighjt\par  meds reviewed\par  cardiac ststus is wnl as echo stress normal

## 2021-07-27 NOTE — HISTORY OF PRESENT ILLNESS
[FreeTextEntry1] : pt is feeling well\par  no chest pains\par  no exertional dyspnoea\par  meds revierwed\par  s/p cacer prostate

## 2021-08-03 ENCOUNTER — NON-APPOINTMENT (OUTPATIENT)
Age: 66
End: 2021-08-03

## 2021-09-23 ENCOUNTER — APPOINTMENT (OUTPATIENT)
Dept: UROLOGY | Facility: CLINIC | Age: 66
End: 2021-09-23
Payer: MEDICARE

## 2021-09-23 PROCEDURE — 99214 OFFICE O/P EST MOD 30 MIN: CPT

## 2021-09-26 NOTE — PHYSICAL EXAM
[General Appearance - Well Developed] : well developed [Normal Appearance] : normal appearance [General Appearance - Well Nourished] : well nourished [Well Groomed] : well groomed [General Appearance - In No Acute Distress] : no acute distress [Abdomen Soft] : soft [Abdomen Tenderness] : non-tender [Costovertebral Angle Tenderness] : no ~M costovertebral angle tenderness [Urethral Meatus] : meatus normal [Urinary Bladder Findings] : the bladder was normal on palpation [Scrotum] : the scrotum was normal [Testes Mass (___cm)] : there were no testicular masses [FreeTextEntry1] : no rectal nodules [Edema] : no peripheral edema [] : no respiratory distress [Exaggerated Use Of Accessory Muscles For Inspiration] : no accessory muscle use [Respiration, Rhythm And Depth] : normal respiratory rhythm and effort [Oriented To Time, Place, And Person] : oriented to person, place, and time [Affect] : the affect was normal [Mood] : the mood was normal [Not Anxious] : not anxious [Normal Station and Gait] : the gait and station were normal for the patient's age [No Focal Deficits] : no focal deficits [No Palpable Adenopathy] : no palpable adenopathy

## 2021-09-26 NOTE — HISTORY OF PRESENT ILLNESS
[FreeTextEntry1] : PIPE TOMAS is a 65 year old male with a history of prostate cancer, [place] 7(3 + 4) , s/p RRP in 2005.\par He has a history of biochemical recurrence, with increasing PSA, since 2018.  Bone scan, at that time, was negative for recurrence.\par \par Axumin study 7/2021\par no new foci since 11/2019\par \par bone scan 6/2021 \par no evidence of metastatic disease\par \par In 2019 his PSA was 1.34 and maximum study was ordered.  Study revealed abnormal uptake of axumin, visually greater than blood pool activity, in bilateral inguinal nodes which measure up to 2.4 x 1.5 cm on the left; this finding is nonspecific, however may reflect tumor activity in the setting of rising PSA.  No additional foci of abnormal axumin uptake. Few bilobar hepatic cysts. Nonobstructing right renal lower pole calculi. Left renal interpolar cyst. \par \par His most recent PSA, from 5/7/2021, is 2.0.  Patient feeling well denies any bone pain, changes in neurological status, or weight loss.\par \par Patient has history of ED and stones, managed by .  Recent ultrasound demonstrates a 7 x 6 mm nonobstructing right stone; will follow up with  for further discussion. [None] : None

## 2021-09-26 NOTE — ASSESSMENT
[FreeTextEntry1] : 64 yo with BCR following RRP\par the patient has a rising PSA despite negative Axumin and bone scan\par \par - will repeat PSA\par - medical oncology evaluation\par - discussed the risks and benefits of ADT \par including impact on bone density and cardiac risk factors\par - all questions answered\par - f/u with Dr. Epstein\par

## 2021-11-09 ENCOUNTER — APPOINTMENT (OUTPATIENT)
Dept: CARDIOLOGY | Facility: CLINIC | Age: 66
End: 2021-11-09

## 2021-11-21 ENCOUNTER — OUTPATIENT (OUTPATIENT)
Dept: OUTPATIENT SERVICES | Facility: HOSPITAL | Age: 66
LOS: 1 days | Discharge: HOME | End: 2021-11-21
Payer: MEDICARE

## 2021-11-21 DIAGNOSIS — Z90.79 ACQUIRED ABSENCE OF OTHER GENITAL ORGAN(S): Chronic | ICD-10-CM

## 2021-11-21 DIAGNOSIS — H57.13 OCULAR PAIN, BILATERAL: ICD-10-CM

## 2021-11-21 PROCEDURE — 70540 MRI ORBIT/FACE/NECK W/O DYE: CPT | Mod: 26,MH

## 2021-11-21 PROCEDURE — 70551 MRI BRAIN STEM W/O DYE: CPT | Mod: 26,MH

## 2021-11-22 ENCOUNTER — APPOINTMENT (OUTPATIENT)
Dept: UROLOGY | Facility: CLINIC | Age: 66
End: 2021-11-22

## 2021-12-16 ENCOUNTER — RESULT REVIEW (OUTPATIENT)
Age: 66
End: 2021-12-16

## 2021-12-16 ENCOUNTER — APPOINTMENT (OUTPATIENT)
Dept: UROLOGY | Facility: CLINIC | Age: 66
End: 2021-12-16
Payer: MEDICARE

## 2021-12-16 VITALS
DIASTOLIC BLOOD PRESSURE: 87 MMHG | HEIGHT: 71 IN | HEART RATE: 63 BPM | WEIGHT: 299 LBS | BODY MASS INDEX: 41.86 KG/M2 | SYSTOLIC BLOOD PRESSURE: 141 MMHG

## 2021-12-16 DIAGNOSIS — Z80.42 FAMILY HISTORY OF MALIGNANT NEOPLASM OF PROSTATE: ICD-10-CM

## 2021-12-16 PROCEDURE — 99215 OFFICE O/P EST HI 40 MIN: CPT

## 2021-12-16 NOTE — ASSESSMENT
[FreeTextEntry1] : With respect to his stones he never did the ultrasound its been over 2 years since his last CAT scan for stones I think we will just get a CT stone protocol will give us a lot more information\par \par As far as his ED he is using Trimix he will continue to use that he is on custom and will plan if he runs out of the need reauthorization he will contact us\par \par As far as his rising PSA I put in a consult to oncology as waiting for him to do it by himself is good to take a little bit too long\par \par He will see me after the CAT scans and as far as the rising PSA if medical oncology wants us involved he will see Dr. Serrano if they can take care of it and he has questions Gaston can either contact me or Dr. Serrano

## 2021-12-16 NOTE — LETTER HEADER
[FreeTextEntry3] : Mikhail Aparicio M.D.\par Director of Urology\par [facility]\par [address] [place] [address], [address]\[place] [address]

## 2021-12-16 NOTE — LETTER BODY
[Dear  ___] : Dear  [unfilled], [Courtesy Letter:] : I had the pleasure of seeing your patient, [unfilled], in my office today. [Please see my note below.] : Please see my note below. [Sincerely,] : Sincerely, [FreeTextEntry2] : Margaret Pride MD\par [address] [place] [address]\[place] [address]\par

## 2021-12-16 NOTE — HISTORY OF PRESENT ILLNESS
[FreeTextEntry1] : Gaston is a 66-year-old male born November 3, 1955 last seen by Dr. Serrano on September 23 and recommended to see oncology which he did not yet do, with a rising PSA with a negative scan.\par \par I last saw him on June 16 he has history of erectile dysfunction which we are treating with low-dose Trimix and kidney stones for which we recommend renal ultrasound and see me after the above.\par \par Since last seen he is using the Trimix as often as his wife will allow using 0.1 mL is working very well, his voiding is stable he still has occasional incontinence he has had that since the surgery but he is living with and he did the ultrasound but I do about that 1 done in May and he never repeated it before he came in.  He is not having any pain no but he did have stones in the right lower pole and needed to be tracked.  Last CAT scan he had for stones was in 2019.  Upon

## 2021-12-16 NOTE — PHYSICAL EXAM
[General Appearance - Well Developed] : well developed [General Appearance - Well Nourished] : well nourished [Normal Appearance] : normal appearance [Well Groomed] : well groomed [General Appearance - In No Acute Distress] : no acute distress [Abdomen Soft] : soft [Abdomen Tenderness] : non-tender [Costovertebral Angle Tenderness] : no ~M costovertebral angle tenderness [Urethral Meatus] : meatus normal [Penis Abnormality] : normal uncircumcised penis [Epididymis] : the epididymides were normal [Testes Tenderness] : no tenderness of the testes [Testes Mass (___cm)] : there were no testicular masses [FreeTextEntry1] : no fibrosis.  I did not do a ASIA at this time, she is developing a small left hydrocele [] : no respiratory distress [Respiration, Rhythm And Depth] : normal respiratory rhythm and effort [Exaggerated Use Of Accessory Muscles For Inspiration] : no accessory muscle use [Oriented To Time, Place, And Person] : oriented to person, place, and time [Affect] : the affect was normal [Mood] : the mood was normal [Not Anxious] : not anxious [Normal Station and Gait] : the gait and station were normal for the patient's age

## 2022-01-12 ENCOUNTER — APPOINTMENT (OUTPATIENT)
Dept: CARDIOLOGY | Facility: CLINIC | Age: 67
End: 2022-01-12
Payer: MEDICARE

## 2022-01-12 VITALS
SYSTOLIC BLOOD PRESSURE: 110 MMHG | TEMPERATURE: 97 F | DIASTOLIC BLOOD PRESSURE: 87 MMHG | HEIGHT: 71 IN | WEIGHT: 302 LBS | BODY MASS INDEX: 42.28 KG/M2

## 2022-01-12 PROCEDURE — 99214 OFFICE O/P EST MOD 30 MIN: CPT

## 2022-01-12 RX ORDER — ASPIRIN ENTERIC COATED TABLETS 81 MG 81 MG/1
81 TABLET, DELAYED RELEASE ORAL DAILY
Qty: 90 | Refills: 3 | Status: ACTIVE | COMMUNITY

## 2022-01-12 RX ORDER — PAPAV/PHENTOLAM/ALPROST/WATER 30-1-20/ML
VIAL (ML) INTRACAVERNOSAL
Refills: 0 | Status: DISCONTINUED | COMMUNITY
End: 2022-01-12

## 2022-01-16 ENCOUNTER — OUTPATIENT (OUTPATIENT)
Dept: OUTPATIENT SERVICES | Facility: HOSPITAL | Age: 67
LOS: 1 days | Discharge: HOME | End: 2022-01-16
Payer: MEDICARE

## 2022-01-16 DIAGNOSIS — Z90.79 ACQUIRED ABSENCE OF OTHER GENITAL ORGAN(S): Chronic | ICD-10-CM

## 2022-01-16 DIAGNOSIS — N20.0 CALCULUS OF KIDNEY: ICD-10-CM

## 2022-01-16 PROCEDURE — 74176 CT ABD & PELVIS W/O CONTRAST: CPT | Mod: 26,MH

## 2022-01-16 PROCEDURE — G1004: CPT

## 2022-02-04 ENCOUNTER — APPOINTMENT (OUTPATIENT)
Dept: UROLOGY | Facility: CLINIC | Age: 67
End: 2022-02-04
Payer: MEDICARE

## 2022-02-04 VITALS
HEART RATE: 61 BPM | HEIGHT: 71 IN | SYSTOLIC BLOOD PRESSURE: 137 MMHG | BODY MASS INDEX: 42.14 KG/M2 | WEIGHT: 301 LBS | DIASTOLIC BLOOD PRESSURE: 87 MMHG | TEMPERATURE: 98 F

## 2022-02-04 PROCEDURE — 99215 OFFICE O/P EST HI 40 MIN: CPT

## 2022-02-04 NOTE — ASSESSMENT
[FreeTextEntry1] : He has slowly rising PSA status post RRP and will be following up with heme oncology for further evaluation.He has some questions about this.  We discussed the plan and I agree with Dr. Serrano.\par \par Concerning his erectile dysfunction it is well managed on Trimix formula #5 0.15 mL and he will continue.\par \par CT demonstrates 2 lower pole renal stones, Hounsfield units roughly 700.  We reviewed all the options and is electing for ESWL of the stones.  All aspects of this procedure were reviewed in detail with regards to preparation, outcomes, and adverse events.\par \par He will obtain medical clearance and will be scheduled accordingly.  KUB is current smoker lithotripsy machine has ultrasound he can see this we should be able to do the procedures.  ER precautions reviewed\par \par In more descriptive form stone options were discussed as below\par Our stone treatment discussion summarized--\par 1. Surveillance: we discussed risks associated with this approach including increase in size of stone, UTIs, renal obstruction.\par \par 2. URS/LL: we discussed how this is done (transurethrally with small cameras, baskets and a laser), the risks (bleeding, infection, damage to  organs, stricture, inability to access the ureter, stent pain which can be mild, moderate or severe) and benefits (minimally invasive). The patient also understands that if our scopes will not fit into the ureter because the ureter is too small, the patient will be stented and left to dilate with a stent ~2 weeks. We will then re-attempt the ureteroscopy. \par \par 3. ESWL: we discussed how this procedure is performed (transcorporeal shock waves under light anesthesia), the risks (bleeding, failure to fragment stones, steinstrasse) and benefits (least invasive technique). \par \par 4. PCNL: we discussed what this procedure entails (tube into the kidney through the back with destruction of the stone via this tube) and the risks (which include bleeding that may or may not require embolization, infection, damage to surrounding organs, pneumothorax/hydrothorax, stricture, need for additional surgeries). This method is preferable for large stones with the highest stone free-rates and it is the most invasive. This surgery would require patient to have a nephrostomy tube post-operatively.\par \par For these treatment, we also discussed the possible need for additional therapies for persistent stone burden. \par We stressed that when ureteral stents are inserted they are temporary and must be removed within 3 months of placement. Otherwise encrustation, urosepsis, obstruction can occur.\par

## 2022-02-04 NOTE — LETTER HEADER
[FreeTextEntry3] : Mikhail Aparicio M.D.\par Director of Urology\par [facility]\par [address] [place] [address], [address]\[place] [address]

## 2022-02-04 NOTE — LETTER BODY
[Dear  ___] : Dear  [unfilled], [Courtesy Letter:] : I had the pleasure of seeing your patient, [unfilled], in my office today. [Please see my note below.] : Please see my note below. [Sincerely,] : Sincerely, [FreeTextEntry2] : Margaret Pride MD\par [address] [place] [address]\[place] [address]\par

## 2022-02-04 NOTE — HISTORY OF PRESENT ILLNESS
[Urinary Incontinence] : urinary incontinence [FreeTextEntry1] : Gaston is a 66-year-old male, with a history of prostate cancer, [place] 7 (3+4), status post RRP in 2005 with history of biochemical recurrence, currently scheduled to see oncology on 3/2/2022.\par \par We have been following him for erectile dysfunction and renal stones.\par \par Currently he is managed on Trimix formula #5 0.15 mL with good efficacy and without adverse events.\par \par He presents today to review his CT scan to review his right lower pole stones.\par

## 2022-02-04 NOTE — PHYSICAL EXAM

## 2022-03-02 ENCOUNTER — LABORATORY RESULT (OUTPATIENT)
Age: 67
End: 2022-03-02

## 2022-03-02 ENCOUNTER — APPOINTMENT (OUTPATIENT)
Dept: HEMATOLOGY ONCOLOGY | Facility: CLINIC | Age: 67
End: 2022-03-02
Payer: MEDICARE

## 2022-03-02 VITALS
BODY MASS INDEX: 41.16 KG/M2 | RESPIRATION RATE: 14 BRPM | HEIGHT: 71 IN | SYSTOLIC BLOOD PRESSURE: 152 MMHG | WEIGHT: 294 LBS | OXYGEN SATURATION: 98 % | HEART RATE: 65 BPM | DIASTOLIC BLOOD PRESSURE: 82 MMHG | TEMPERATURE: 97.1 F

## 2022-03-02 LAB
HCT VFR BLD CALC: 44.3 %
HGB BLD-MCNC: 14.9 G/DL
MCHC RBC-ENTMCNC: 29.9 PG
MCHC RBC-ENTMCNC: 33.6 G/DL
MCV RBC AUTO: 88.8 FL
PLATELET # BLD AUTO: 222 K/UL
PMV BLD: 8.9 FL
RBC # BLD: 4.99 M/UL
RBC # FLD: 13.5 %
WBC # FLD AUTO: 4.24 K/UL

## 2022-03-02 PROCEDURE — 99205 OFFICE O/P NEW HI 60 MIN: CPT

## 2022-03-02 NOTE — REVIEW OF SYSTEMS
[Fever] : no fever [Eye Pain] : no eye pain [Dysphagia] : no dysphagia [Odynophagia] : no odynophagia [Chest Pain] : no chest pain [Shortness Of Breath] : no shortness of breath [Dysuria] : no dysuria [Abdominal Pain] : no abdominal pain [Joint Pain] : no joint pain [Skin Rash] : no skin rash [Confused] : no confusion [Suicidal] : not suicidal [Proptosis] : no proptosis [Negative] : Heme/Lymph

## 2022-03-02 NOTE — HISTORY OF PRESENT ILLNESS
[de-identified] : 65 yo  man with ECOG 1-2 and pmh of morbid obesity and diabetes, has prostate cancer treated in 2005 with RRP; at that time, [place] 3+4 (7). He is referred to oncology for biochemical recurrence. PSA available in the system in 2019, 2020,2021 is 1.3-2; the last PSA was done in May 2021 in the system; bone scan done in 08/2021 did not reveal bone mets; CT A/P 01/2022 DID not show any mets; showed hepatic steatosis.\par \par NO smoking\par FH of prostate cancer in father in his 70s\par

## 2022-03-02 NOTE — ASSESSMENT
[FreeTextEntry1] : 65 yo man diagnosed with prostate cancer at 48yo (2005) [place] 3+4; s/p RRP. Never was recommended ADT\par - pt is referred for biochemical recurrence (PSA 1.3-2 [id number]; last PSA availble 05/2021\par - bone scan was negative in 2021\par - he is asymptomatic\par - in view of the above, repeat PSA ;and obtain PSMA PET scan: if there are signs of local recurrence, will refer to RT\par - All questions answered; the management depends on the trend of PSA and PSMA PET result\par - also was advised to lose weight

## 2022-03-03 LAB
ALBUMIN SERPL ELPH-MCNC: 4.2 G/DL
ALP BLD-CCNC: 68 U/L
ALT SERPL-CCNC: 22 U/L
ANION GAP SERPL CALC-SCNC: 11 MMOL/L
AST SERPL-CCNC: 18 U/L
BILIRUB DIRECT SERPL-MCNC: <0.2 MG/DL
BILIRUB INDIRECT SERPL-MCNC: >0.4 MG/DL
BILIRUB SERPL-MCNC: 0.6 MG/DL
BUN SERPL-MCNC: 14 MG/DL
CALCIUM SERPL-MCNC: 9.7 MG/DL
CHLORIDE SERPL-SCNC: 101 MMOL/L
CO2 SERPL-SCNC: 29 MMOL/L
CREAT SERPL-MCNC: 1.1 MG/DL
EGFR: 74 ML/MIN/1.73M2
GLUCOSE SERPL-MCNC: 91 MG/DL
POTASSIUM SERPL-SCNC: 4.3 MMOL/L
PROT SERPL-MCNC: 7.9 G/DL
PSA SERPL-MCNC: 2.5 NG/ML
SODIUM SERPL-SCNC: 141 MMOL/L
TESTOST SERPL-MCNC: 328 NG/DL

## 2022-03-28 ENCOUNTER — RESULT REVIEW (OUTPATIENT)
Age: 67
End: 2022-03-28

## 2022-03-28 ENCOUNTER — OUTPATIENT (OUTPATIENT)
Dept: OUTPATIENT SERVICES | Facility: HOSPITAL | Age: 67
LOS: 1 days | Discharge: HOME | End: 2022-03-28
Payer: MEDICARE

## 2022-03-28 DIAGNOSIS — C61 MALIGNANT NEOPLASM OF PROSTATE: ICD-10-CM

## 2022-03-28 DIAGNOSIS — Z90.79 ACQUIRED ABSENCE OF OTHER GENITAL ORGAN(S): Chronic | ICD-10-CM

## 2022-03-28 LAB — GLUCOSE BLDC GLUCOMTR-MCNC: 89 MG/DL — SIGNIFICANT CHANGE UP (ref 70–99)

## 2022-03-28 PROCEDURE — 78816 PET IMAGE W/CT FULL BODY: CPT | Mod: 26,MH

## 2022-03-29 ENCOUNTER — OUTPATIENT (OUTPATIENT)
Dept: OUTPATIENT SERVICES | Facility: HOSPITAL | Age: 67
LOS: 1 days | Discharge: HOME | End: 2022-03-29
Payer: MEDICARE

## 2022-03-29 ENCOUNTER — RESULT REVIEW (OUTPATIENT)
Age: 67
End: 2022-03-29

## 2022-03-29 VITALS
HEART RATE: 62 BPM | WEIGHT: 292.99 LBS | TEMPERATURE: 97 F | RESPIRATION RATE: 18 BRPM | DIASTOLIC BLOOD PRESSURE: 87 MMHG | HEIGHT: 71 IN | OXYGEN SATURATION: 97 % | SYSTOLIC BLOOD PRESSURE: 154 MMHG

## 2022-03-29 DIAGNOSIS — N20.0 CALCULUS OF KIDNEY: ICD-10-CM

## 2022-03-29 DIAGNOSIS — Z01.818 ENCOUNTER FOR OTHER PREPROCEDURAL EXAMINATION: ICD-10-CM

## 2022-03-29 DIAGNOSIS — Z98.890 OTHER SPECIFIED POSTPROCEDURAL STATES: Chronic | ICD-10-CM

## 2022-03-29 DIAGNOSIS — Z90.79 ACQUIRED ABSENCE OF OTHER GENITAL ORGAN(S): Chronic | ICD-10-CM

## 2022-03-29 LAB
ALBUMIN SERPL ELPH-MCNC: 4.3 G/DL — SIGNIFICANT CHANGE UP (ref 3.5–5.2)
ALP SERPL-CCNC: 58 U/L — SIGNIFICANT CHANGE UP (ref 30–115)
ALT FLD-CCNC: 22 U/L — SIGNIFICANT CHANGE UP (ref 0–41)
ANION GAP SERPL CALC-SCNC: 11 MMOL/L — SIGNIFICANT CHANGE UP (ref 7–14)
APPEARANCE UR: CLEAR — SIGNIFICANT CHANGE UP
APTT BLD: 34.5 SEC — SIGNIFICANT CHANGE UP (ref 27–39.2)
AST SERPL-CCNC: 21 U/L — SIGNIFICANT CHANGE UP (ref 0–41)
BASOPHILS # BLD AUTO: 0.01 K/UL — SIGNIFICANT CHANGE UP (ref 0–0.2)
BASOPHILS NFR BLD AUTO: 0.2 % — SIGNIFICANT CHANGE UP (ref 0–1)
BILIRUB SERPL-MCNC: 0.6 MG/DL — SIGNIFICANT CHANGE UP (ref 0.2–1.2)
BILIRUB UR-MCNC: NEGATIVE — SIGNIFICANT CHANGE UP
BUN SERPL-MCNC: 15 MG/DL — SIGNIFICANT CHANGE UP (ref 10–20)
CALCIUM SERPL-MCNC: 9.5 MG/DL — SIGNIFICANT CHANGE UP (ref 8.5–10.1)
CHLORIDE SERPL-SCNC: 101 MMOL/L — SIGNIFICANT CHANGE UP (ref 98–110)
CO2 SERPL-SCNC: 28 MMOL/L — SIGNIFICANT CHANGE UP (ref 17–32)
COLOR SPEC: YELLOW — SIGNIFICANT CHANGE UP
CREAT SERPL-MCNC: 1.2 MG/DL — SIGNIFICANT CHANGE UP (ref 0.7–1.5)
DIFF PNL FLD: ABNORMAL
EGFR: 67 ML/MIN/1.73M2 — SIGNIFICANT CHANGE UP
EOSINOPHIL # BLD AUTO: 0.14 K/UL — SIGNIFICANT CHANGE UP (ref 0–0.7)
EOSINOPHIL NFR BLD AUTO: 3.3 % — SIGNIFICANT CHANGE UP (ref 0–8)
GLUCOSE SERPL-MCNC: 87 MG/DL — SIGNIFICANT CHANGE UP (ref 70–99)
GLUCOSE UR QL: NEGATIVE — SIGNIFICANT CHANGE UP
HCT VFR BLD CALC: 42.7 % — SIGNIFICANT CHANGE UP (ref 42–52)
HGB BLD-MCNC: 14.5 G/DL — SIGNIFICANT CHANGE UP (ref 14–18)
IMM GRANULOCYTES NFR BLD AUTO: 0.2 % — SIGNIFICANT CHANGE UP (ref 0.1–0.3)
INR BLD: 1.04 RATIO — SIGNIFICANT CHANGE UP (ref 0.65–1.3)
KETONES UR-MCNC: NEGATIVE — SIGNIFICANT CHANGE UP
LEUKOCYTE ESTERASE UR-ACNC: ABNORMAL
LYMPHOCYTES # BLD AUTO: 2.18 K/UL — SIGNIFICANT CHANGE UP (ref 1.2–3.4)
LYMPHOCYTES # BLD AUTO: 51.9 % — HIGH (ref 20.5–51.1)
MCHC RBC-ENTMCNC: 29.9 PG — SIGNIFICANT CHANGE UP (ref 27–31)
MCHC RBC-ENTMCNC: 34 G/DL — SIGNIFICANT CHANGE UP (ref 32–37)
MCV RBC AUTO: 88 FL — SIGNIFICANT CHANGE UP (ref 80–94)
MONOCYTES # BLD AUTO: 0.45 K/UL — SIGNIFICANT CHANGE UP (ref 0.1–0.6)
MONOCYTES NFR BLD AUTO: 10.7 % — HIGH (ref 1.7–9.3)
NEUTROPHILS # BLD AUTO: 1.41 K/UL — SIGNIFICANT CHANGE UP (ref 1.4–6.5)
NEUTROPHILS NFR BLD AUTO: 33.7 % — LOW (ref 42.2–75.2)
NITRITE UR-MCNC: POSITIVE
NRBC # BLD: 0 /100 WBCS — SIGNIFICANT CHANGE UP (ref 0–0)
PH UR: 6 — SIGNIFICANT CHANGE UP (ref 5–8)
PLATELET # BLD AUTO: 245 K/UL — SIGNIFICANT CHANGE UP (ref 130–400)
POTASSIUM SERPL-MCNC: 3.8 MMOL/L — SIGNIFICANT CHANGE UP (ref 3.5–5)
POTASSIUM SERPL-SCNC: 3.8 MMOL/L — SIGNIFICANT CHANGE UP (ref 3.5–5)
PROT SERPL-MCNC: 7.8 G/DL — SIGNIFICANT CHANGE UP (ref 6–8)
PROT UR-MCNC: SIGNIFICANT CHANGE UP
PROTHROM AB SERPL-ACNC: 12 SEC — SIGNIFICANT CHANGE UP (ref 9.95–12.87)
RBC # BLD: 4.85 M/UL — SIGNIFICANT CHANGE UP (ref 4.7–6.1)
RBC # FLD: 13.5 % — SIGNIFICANT CHANGE UP (ref 11.5–14.5)
SODIUM SERPL-SCNC: 140 MMOL/L — SIGNIFICANT CHANGE UP (ref 135–146)
SP GR SPEC: 1.02 — SIGNIFICANT CHANGE UP (ref 1.01–1.03)
UROBILINOGEN FLD QL: SIGNIFICANT CHANGE UP
WBC # BLD: 4.2 K/UL — LOW (ref 4.8–10.8)
WBC # FLD AUTO: 4.2 K/UL — LOW (ref 4.8–10.8)

## 2022-03-29 PROCEDURE — 71046 X-RAY EXAM CHEST 2 VIEWS: CPT | Mod: 26

## 2022-03-29 PROCEDURE — 93010 ELECTROCARDIOGRAM REPORT: CPT

## 2022-03-29 RX ORDER — LOSARTAN POTASSIUM 100 MG/1
1 TABLET, FILM COATED ORAL
Qty: 0 | Refills: 0 | DISCHARGE

## 2022-03-29 NOTE — H&P PST ADULT - HISTORY OF PRESENT ILLNESS
PATIENT DENIES CHEST PAIN, SHORTNESS OF BREATH, PALPITATIONS, COUGHING, FEVER, DYSURIA.  CAN WALK UP 2-3 FLIGHTS OF STEPS WITHOUT SOB.    NO COUGH, FEVER, SORE THROAT, HEADACHE, LOSS OF TASTE OR SMELL. NO KNOWN EXPOSURE TO ANYONE WITH COVID. PATIENT WAS INSTRUCTED TO ISOLATE FROM NOW UNTIL THE SURGERY.  VACCINATED X2 WITH PFIZER     Anesthesia Alert  + Difficult Airway (CLASS IV)  NO--History of neck surgery or radiation  NO--Limited ROM of neck  NO--History of Malignant hyperthermia  NO--Personal or family history of Pseudocholinesterase deficiency  NO--Prior Anesthesia Complication  NO--Latex Allergy  NO--Loose teeth  NO--History of Rheumatoid Arthritis  NO--HERNANDEZ  Slight bleeding risk (on baby ASA)

## 2022-03-29 NOTE — H&P PST ADULT - ATTENDING COMMENTS
Because of other comorbidities the procedure was canceled to be rescheduled at a later time the H&P was reviewed for the follow-up admission.

## 2022-03-29 NOTE — H&P PST ADULT - NSICDXFAMILYHX_GEN_ALL_CORE_FT
FAMILY HISTORY:  Father  Still living? No  Family history of CHF (congestive heart failure), Age at diagnosis: Age Unknown    Mother  Still living? No  FH: heart attack, Age at diagnosis: Age Unknown

## 2022-03-29 NOTE — H&P PST ADULT - NSICDXPASTMEDICALHX_GEN_ALL_CORE_FT
PAST MEDICAL HISTORY:  Essential hypertension     Kidney stones     Obese BMI=40.9    Prostate cancer 2005

## 2022-03-29 NOTE — H&P PST ADULT - REASON FOR ADMISSION
67 Y/O MALE HERE FOR PRE-ADMISSION SURGICAL TESTING. PATIENT REPORTS HAS A H/O KIDNEY STONES SINCE 2005. HE WENT FOR A ROUTINE FOLLOW UP WITH DR CHURCH. LAST VISIT 1 MONTH AGO, REVEALED 2 KIDNEY STONES ON THE RIGHT SIDE.  NOW FOR SCHEDULED PROCEDURE.

## 2022-03-30 LAB
BACTERIA # UR AUTO: ABNORMAL
EPI CELLS # UR: 1 /HPF — SIGNIFICANT CHANGE UP (ref 0–5)
HYALINE CASTS # UR AUTO: 2 /LPF — SIGNIFICANT CHANGE UP (ref 0–7)
RBC CASTS # UR COMP ASSIST: 3 /HPF — SIGNIFICANT CHANGE UP (ref 0–4)
WBC UR QL: 26 /HPF — HIGH (ref 0–5)

## 2022-04-01 ENCOUNTER — APPOINTMENT (OUTPATIENT)
Dept: HEMATOLOGY ONCOLOGY | Facility: CLINIC | Age: 67
End: 2022-04-01
Payer: SUBSIDIZED

## 2022-04-01 VITALS
WEIGHT: 293 LBS | SYSTOLIC BLOOD PRESSURE: 170 MMHG | RESPIRATION RATE: 20 BRPM | TEMPERATURE: 97.5 F | DIASTOLIC BLOOD PRESSURE: 110 MMHG | HEART RATE: 62 BPM | BODY MASS INDEX: 41.02 KG/M2 | HEIGHT: 71 IN

## 2022-04-01 PROBLEM — E66.9 OBESITY, UNSPECIFIED: Chronic | Status: ACTIVE | Noted: 2022-03-29

## 2022-04-01 PROBLEM — C61 MALIGNANT NEOPLASM OF PROSTATE: Chronic | Status: ACTIVE | Noted: 2022-03-29

## 2022-04-01 PROBLEM — N20.0 CALCULUS OF KIDNEY: Chronic | Status: ACTIVE | Noted: 2022-03-29

## 2022-04-01 LAB
-  AMIKACIN: SIGNIFICANT CHANGE UP
-  AMIKACIN: SIGNIFICANT CHANGE UP
-  AMOXICILLIN/CLAVULANIC ACID: SIGNIFICANT CHANGE UP
-  AMOXICILLIN/CLAVULANIC ACID: SIGNIFICANT CHANGE UP
-  AMPICILLIN/SULBACTAM: SIGNIFICANT CHANGE UP
-  AMPICILLIN/SULBACTAM: SIGNIFICANT CHANGE UP
-  AMPICILLIN: SIGNIFICANT CHANGE UP
-  AMPICILLIN: SIGNIFICANT CHANGE UP
-  AZTREONAM: SIGNIFICANT CHANGE UP
-  AZTREONAM: SIGNIFICANT CHANGE UP
-  CEFAZOLIN: SIGNIFICANT CHANGE UP
-  CEFAZOLIN: SIGNIFICANT CHANGE UP
-  CEFEPIME: SIGNIFICANT CHANGE UP
-  CEFEPIME: SIGNIFICANT CHANGE UP
-  CEFOXITIN: SIGNIFICANT CHANGE UP
-  CEFOXITIN: SIGNIFICANT CHANGE UP
-  CEFTRIAXONE: SIGNIFICANT CHANGE UP
-  CEFTRIAXONE: SIGNIFICANT CHANGE UP
-  CIPROFLOXACIN: SIGNIFICANT CHANGE UP
-  CIPROFLOXACIN: SIGNIFICANT CHANGE UP
-  ERTAPENEM: SIGNIFICANT CHANGE UP
-  ERTAPENEM: SIGNIFICANT CHANGE UP
-  GENTAMICIN: SIGNIFICANT CHANGE UP
-  GENTAMICIN: SIGNIFICANT CHANGE UP
-  IMIPENEM: SIGNIFICANT CHANGE UP
-  IMIPENEM: SIGNIFICANT CHANGE UP
-  LEVOFLOXACIN: SIGNIFICANT CHANGE UP
-  LEVOFLOXACIN: SIGNIFICANT CHANGE UP
-  MEROPENEM: SIGNIFICANT CHANGE UP
-  MEROPENEM: SIGNIFICANT CHANGE UP
-  NITROFURANTOIN: SIGNIFICANT CHANGE UP
-  NITROFURANTOIN: SIGNIFICANT CHANGE UP
-  PIPERACILLIN/TAZOBACTAM: SIGNIFICANT CHANGE UP
-  PIPERACILLIN/TAZOBACTAM: SIGNIFICANT CHANGE UP
-  TIGECYCLINE: SIGNIFICANT CHANGE UP
-  TIGECYCLINE: SIGNIFICANT CHANGE UP
-  TOBRAMYCIN: SIGNIFICANT CHANGE UP
-  TOBRAMYCIN: SIGNIFICANT CHANGE UP
-  TRIMETHOPRIM/SULFAMETHOXAZOLE: SIGNIFICANT CHANGE UP
-  TRIMETHOPRIM/SULFAMETHOXAZOLE: SIGNIFICANT CHANGE UP
CULTURE RESULTS: SIGNIFICANT CHANGE UP
METHOD TYPE: SIGNIFICANT CHANGE UP
METHOD TYPE: SIGNIFICANT CHANGE UP
ORGANISM # SPEC MICROSCOPIC CNT: SIGNIFICANT CHANGE UP
SPECIMEN SOURCE: SIGNIFICANT CHANGE UP

## 2022-04-01 PROCEDURE — 99214 OFFICE O/P EST MOD 30 MIN: CPT

## 2022-04-01 NOTE — CONSULT LETTER
[Dear  ___] : Dear  [unfilled], [Please see my note below.] : Please see my note below. [Sincerely,] : Sincerely, [FreeTextEntry3] : Herberth Gudino

## 2022-04-01 NOTE — HISTORY OF PRESENT ILLNESS
[de-identified] : 65 yo  man with ECOG 1-2 and pmh of morbid obesity and diabetes, has prostate cancer treated in 2005 with RRP; at that time, [place] 3+4 (7). He is referred to oncology for biochemical recurrence. PSA available in the system in 2019, 2020,2021 is 1.3-2; the last PSA was done in May 2021 in the system; bone scan done in 08/2021 did not reveal bone mets; CT A/P 01/2022 DID not show any mets; showed hepatic steatosis.\par \par NO smoking\par FH of prostate cancer in father in his 70s\par  [de-identified] : 4/1/22\par Patient is here for a follow-up visit for Prostate Cancer.  He is feeling well with no new complaints.  Reviewed most recent PET/CT which does not show any definite sites of abnormal uptake to suggest metastatic disease, but noted focal intense abnormal uptake within the stomach fundus of unclear etiology and may be inflammatory.  Patient denies dyspnea, abdominal pain, new bony pain or bleeding.  Most recent PSA is 2.5ng/mL from 03/2022.  \par PET/CT PSMA (3.28.2022) IMPRESSION:No definite sites of abnormal PYLARIFY uptake to suggest metastatic disease.Focal intense abnormal PYLARIFY uptake within the stomach fundus of unclear etiology and may be inflammatory; however neoplasm is not excluded. Correlation with direct visualization suggested.

## 2022-04-01 NOTE — REVIEW OF SYSTEMS
[Negative] : Allergic/Immunologic [Fever] : no fever [Eye Pain] : no eye pain [Dysphagia] : no dysphagia [Odynophagia] : no odynophagia [Chest Pain] : no chest pain [Shortness Of Breath] : no shortness of breath [Abdominal Pain] : no abdominal pain [Dysuria] : no dysuria [Joint Pain] : no joint pain [Skin Rash] : no skin rash [Confused] : no confusion [Suicidal] : not suicidal [Proptosis] : no proptosis

## 2022-04-01 NOTE — ASSESSMENT
[FreeTextEntry1] : 65 yo man diagnosed with prostate cancer at 50yo (2005) [place] 3+4; s/p RRP. Never was recommended ADT\par - pt is referred for biochemical recurrence (PSA 1.3-2 [id number]; last PSA availble 05/2021\par - bone scan was negative in 2021\par - Most recent PSA is 2.5ng/mL from 03/2022.  \par - PET/CT PSMA from 3.28.2022 does not show any definite sites of abnormal uptake to suggest metastatic disease, but noted focal intense abnormal uptake within the stomach fundus of unclear etiology and may be inflammatory\par - agreed to closely monitor for now as he remains asymptomatic and if there are signs of local recurrence, will refer to RT\par \par Encouraged to achieve healthy body weight through lifestyle modification which may include increased physical activity, as tolerated, and dietary modifications/portion control.\par \par He is tentatively planning to move down to [place] at the end of May 2022 ; encouraged to obtain all medical records and imaging to establish with new providers when he relocates to [place]. \par \par RTC in 2 months with PSA 1 week prior  \par SEEN/examned w/ NP Gagandeep; note reviewed; case discussed: \par 65 yo man with prostate cancer s/p resection in 2005, found to have intermediate favorable disease with [place] 3+4; no RT or ADT was indicated; his PSA is slowly rising , most recent is 2.5 in 03/2022 (pt was explained that he has stones which could cause a systemic response and elevation of PSA; discussed findings of PSMA PET which were not suspicious of mets: i recommend to repeat PSA in 3 months and continue surveillance at this time; PSMA PET also noted abnormal uptake in the stomach: recommend EGD /visit to GI

## 2022-04-04 ENCOUNTER — NON-APPOINTMENT (OUTPATIENT)
Age: 67
End: 2022-04-04

## 2022-04-28 ENCOUNTER — APPOINTMENT (OUTPATIENT)
Dept: CARDIOLOGY | Facility: CLINIC | Age: 67
End: 2022-04-28

## 2022-04-29 ENCOUNTER — APPOINTMENT (OUTPATIENT)
Dept: CARDIOLOGY | Facility: CLINIC | Age: 67
End: 2022-04-29

## 2022-05-03 ENCOUNTER — OUTPATIENT (OUTPATIENT)
Dept: OUTPATIENT SERVICES | Facility: HOSPITAL | Age: 67
LOS: 1 days | Discharge: HOME | End: 2022-05-03
Payer: MEDICARE

## 2022-05-03 VITALS
SYSTOLIC BLOOD PRESSURE: 126 MMHG | WEIGHT: 294.1 LBS | TEMPERATURE: 96 F | OXYGEN SATURATION: 100 % | RESPIRATION RATE: 14 BRPM | HEART RATE: 64 BPM | DIASTOLIC BLOOD PRESSURE: 75 MMHG | HEIGHT: 71 IN

## 2022-05-03 DIAGNOSIS — Z01.818 ENCOUNTER FOR OTHER PREPROCEDURAL EXAMINATION: ICD-10-CM

## 2022-05-03 DIAGNOSIS — Z98.890 OTHER SPECIFIED POSTPROCEDURAL STATES: Chronic | ICD-10-CM

## 2022-05-03 DIAGNOSIS — Z90.79 ACQUIRED ABSENCE OF OTHER GENITAL ORGAN(S): Chronic | ICD-10-CM

## 2022-05-03 DIAGNOSIS — N20.0 CALCULUS OF KIDNEY: ICD-10-CM

## 2022-05-03 PROCEDURE — 93010 ELECTROCARDIOGRAM REPORT: CPT

## 2022-05-03 NOTE — H&P PST ADULT - NSICDXPASTMEDICALHX_GEN_ALL_CORE_FT
PAST MEDICAL HISTORY:  Arthritis B/L KNEES    Essential hypertension     Kidney stones     Obese BMI=40.9    Prostate cancer 2005

## 2022-05-03 NOTE — H&P PST ADULT - NSICDXFAMILYHX_GEN_ALL_CORE_FT
FAMILY HISTORY:  Father  Still living? No  Family history of CHF (congestive heart failure), Age at diagnosis: Age Unknown  FH: dementia, Age at diagnosis: Age Unknown    Mother  Still living? No  FH: heart attack, Age at diagnosis: Age Unknown

## 2022-05-03 NOTE — H&P PST ADULT - HISTORY OF PRESENT ILLNESS
66y Male presents today for presurgical testing for RIGHT EXTRACORPOREAL SHOCKWAVE LITHOTRIPSY. Patient reports incidental finding of non obstructing kidney stones on the right during routine PET scan done as surveillance for his prostate cancer. He denies any urinary retention, hematuria or pain. He offers no other complaints at this time.   Patient denies any CP, palpitations, SOB, cough, or dysuria. No recent URI or UTI.  Stated exercise tolerance is FOS 2  HERNANDEZ screen reviewed    Patient denies any recent personal exposure to COVID19. Denies any sick contacts. Patient denies travel within the past 30 days. Patient was instructed to quarantine until after procedure.    Anesthesia Alert  YES--Difficult Airway - CLASS IV  NO--History of neck surgery or radiation  NO--Limited ROM of neck  NO--History of Malignant hyperthermia  NO--Personal or family history of Pseudocholinesterase deficiency.  NO--Prior Anesthesia Complication  NO--Latex Allergy  NO--Loose teeth  NO--History of Rheumatoid Arthritis  NO--HERNANDEZ  YES--Bleeding risk - DAILY ASA 81MG  NO--Other_____    Patient states that this is their complete medical history and list of medications.  Patient verbalized understanding of instructions given during this visit and was given the opportunity to ask questions and have them answered. They were instructed to follow up with their surgeon/surgeon's office with any questions regarding their procedure.

## 2022-05-03 NOTE — H&P PST ADULT - REASON FOR ADMISSION
Case Type: OP Non-block TimeSuite: OR [facility] CenterProceduralist: Mikhail Aparicio  Confirmed Surgery DateTime: 05- - 0:00PAST DateTime: 05- - 16:00Procedure: RIGHT EXTRACORPOREAL SHOCKWAVE LITHOTRIPSY  Laterality: RightLength of Procedure: 90 MinutesAnesthesia Type: General

## 2022-05-04 LAB
APPEARANCE UR: ABNORMAL
BACTERIA # UR AUTO: ABNORMAL
BILIRUB UR-MCNC: NEGATIVE — SIGNIFICANT CHANGE UP
COLOR SPEC: YELLOW — SIGNIFICANT CHANGE UP
DIFF PNL FLD: ABNORMAL
EPI CELLS # UR: 1 /HPF — SIGNIFICANT CHANGE UP (ref 0–5)
GLUCOSE UR QL: NEGATIVE — SIGNIFICANT CHANGE UP
HYALINE CASTS # UR AUTO: 5 /LPF — SIGNIFICANT CHANGE UP (ref 0–7)
KETONES UR-MCNC: NEGATIVE — SIGNIFICANT CHANGE UP
LEUKOCYTE ESTERASE UR-ACNC: ABNORMAL
NITRITE UR-MCNC: POSITIVE
PH UR: 6 — SIGNIFICANT CHANGE UP (ref 5–8)
PROT UR-MCNC: SIGNIFICANT CHANGE UP
RBC CASTS # UR COMP ASSIST: 2 /HPF — SIGNIFICANT CHANGE UP (ref 0–4)
SP GR SPEC: 1.02 — SIGNIFICANT CHANGE UP (ref 1.01–1.03)
UROBILINOGEN FLD QL: SIGNIFICANT CHANGE UP
WBC UR QL: 56 /HPF — HIGH (ref 0–5)

## 2022-05-09 PROBLEM — M19.90 UNSPECIFIED OSTEOARTHRITIS, UNSPECIFIED SITE: Chronic | Status: ACTIVE | Noted: 2022-05-03

## 2022-05-10 ENCOUNTER — LABORATORY RESULT (OUTPATIENT)
Age: 67
End: 2022-05-10

## 2022-05-10 ENCOUNTER — APPOINTMENT (OUTPATIENT)
Dept: UROLOGY | Facility: CLINIC | Age: 67
End: 2022-05-10

## 2022-05-10 DIAGNOSIS — N39.0 URINARY TRACT INFECTION, SITE NOT SPECIFIED: ICD-10-CM

## 2022-05-10 DIAGNOSIS — A49.9 URINARY TRACT INFECTION, SITE NOT SPECIFIED: ICD-10-CM

## 2022-05-12 LAB
APPEARANCE: CLEAR
BACTERIA UR CULT: NORMAL
BILIRUBIN URINE: NEGATIVE
BLOOD URINE: NEGATIVE
COLOR: NORMAL
GLUCOSE QUALITATIVE U: NEGATIVE
KETONES URINE: NEGATIVE
LEUKOCYTE ESTERASE URINE: ABNORMAL
NITRITE URINE: NEGATIVE
PH URINE: 6
PROTEIN URINE: NEGATIVE
SPECIFIC GRAVITY URINE: 1.02
UROBILINOGEN URINE: NORMAL

## 2022-05-13 ENCOUNTER — APPOINTMENT (OUTPATIENT)
Dept: CARDIOLOGY | Facility: CLINIC | Age: 67
End: 2022-05-13
Payer: MEDICARE

## 2022-05-13 VITALS
HEART RATE: 68 BPM | WEIGHT: 290 LBS | DIASTOLIC BLOOD PRESSURE: 90 MMHG | TEMPERATURE: 96.9 F | HEIGHT: 71 IN | SYSTOLIC BLOOD PRESSURE: 148 MMHG | BODY MASS INDEX: 40.6 KG/M2

## 2022-05-13 PROCEDURE — 93000 ELECTROCARDIOGRAM COMPLETE: CPT

## 2022-05-13 PROCEDURE — 99215 OFFICE O/P EST HI 40 MIN: CPT

## 2022-05-13 RX ORDER — LABETALOL HYDROCHLORIDE 300 MG/1
300 TABLET, FILM COATED ORAL
Qty: 90 | Refills: 0 | Status: ACTIVE | COMMUNITY

## 2022-05-13 RX ORDER — ATORVASTATIN CALCIUM 20 MG/1
20 TABLET, FILM COATED ORAL
Qty: 90 | Refills: 3 | Status: DISCONTINUED | COMMUNITY
Start: 2021-05-21 | End: 2022-05-13

## 2022-05-13 RX ORDER — HYDROCHLOROTHIAZIDE 25 MG/1
25 TABLET ORAL DAILY
Qty: 90 | Refills: 3 | Status: ACTIVE | COMMUNITY
Start: 2021-05-03 | End: 1900-01-01

## 2022-05-13 RX ORDER — NIFEDIPINE 60 MG/1
60 TABLET, FILM COATED, EXTENDED RELEASE ORAL
Qty: 90 | Refills: 3 | Status: ACTIVE | COMMUNITY
Start: 2017-01-25 | End: 1900-01-01

## 2022-05-14 ENCOUNTER — LABORATORY RESULT (OUTPATIENT)
Age: 67
End: 2022-05-14

## 2022-05-15 LAB
CHOLEST SERPL-MCNC: 200 MG/DL
ESTIMATED AVERAGE GLUCOSE: 111 MG/DL
HBA1C MFR BLD HPLC: 5.5 %
HDLC SERPL-MCNC: 41 MG/DL
LDLC SERPL CALC-MCNC: 135 MG/DL
NONHDLC SERPL-MCNC: 159 MG/DL
TRIGL SERPL-MCNC: 118 MG/DL
TSH SERPL-ACNC: 2.46 UIU/ML

## 2022-05-17 ENCOUNTER — APPOINTMENT (OUTPATIENT)
Dept: UROLOGY | Facility: HOSPITAL | Age: 67
End: 2022-05-17

## 2022-05-17 ENCOUNTER — RESULT REVIEW (OUTPATIENT)
Age: 67
End: 2022-05-17

## 2022-05-17 ENCOUNTER — TRANSCRIPTION ENCOUNTER (OUTPATIENT)
Age: 67
End: 2022-05-17

## 2022-05-17 ENCOUNTER — OUTPATIENT (OUTPATIENT)
Dept: OUTPATIENT SERVICES | Facility: HOSPITAL | Age: 67
LOS: 1 days | Discharge: HOME | End: 2022-05-17
Payer: MEDICARE

## 2022-05-17 VITALS — SYSTOLIC BLOOD PRESSURE: 174 MMHG | HEART RATE: 54 BPM | DIASTOLIC BLOOD PRESSURE: 86 MMHG | OXYGEN SATURATION: 99 %

## 2022-05-17 VITALS
DIASTOLIC BLOOD PRESSURE: 97 MMHG | HEIGHT: 71 IN | HEART RATE: 71 BPM | TEMPERATURE: 98 F | SYSTOLIC BLOOD PRESSURE: 152 MMHG | WEIGHT: 294.1 LBS | RESPIRATION RATE: 17 BRPM | OXYGEN SATURATION: 100 %

## 2022-05-17 DIAGNOSIS — Z90.79 ACQUIRED ABSENCE OF OTHER GENITAL ORGAN(S): Chronic | ICD-10-CM

## 2022-05-17 DIAGNOSIS — Z98.890 OTHER SPECIFIED POSTPROCEDURAL STATES: Chronic | ICD-10-CM

## 2022-05-17 PROCEDURE — 74018 RADEX ABDOMEN 1 VIEW: CPT | Mod: 26

## 2022-05-17 PROCEDURE — 50590 FRAGMENTING OF KIDNEY STONE: CPT | Mod: RT

## 2022-05-17 RX ORDER — HYDROMORPHONE HYDROCHLORIDE 2 MG/ML
1 INJECTION INTRAMUSCULAR; INTRAVENOUS; SUBCUTANEOUS ONCE
Refills: 0 | Status: DISCONTINUED | OUTPATIENT
Start: 2022-05-17 | End: 2022-05-17

## 2022-05-17 RX ORDER — HYDROMORPHONE HYDROCHLORIDE 2 MG/ML
0.5 INJECTION INTRAMUSCULAR; INTRAVENOUS; SUBCUTANEOUS ONCE
Refills: 0 | Status: DISCONTINUED | OUTPATIENT
Start: 2022-05-17 | End: 2022-05-17

## 2022-05-17 RX ORDER — ASPIRIN/CALCIUM CARB/MAGNESIUM 324 MG
0 TABLET ORAL
Qty: 0 | Refills: 0 | DISCHARGE

## 2022-05-17 RX ORDER — SODIUM CHLORIDE 9 MG/ML
1000 INJECTION, SOLUTION INTRAVENOUS
Refills: 0 | Status: DISCONTINUED | OUTPATIENT
Start: 2022-05-17 | End: 2022-05-17

## 2022-05-17 RX ORDER — ONDANSETRON 8 MG/1
4 TABLET, FILM COATED ORAL ONCE
Refills: 0 | Status: DISCONTINUED | OUTPATIENT
Start: 2022-05-17 | End: 2022-05-17

## 2022-05-17 RX ORDER — LABETALOL HCL 100 MG
1 TABLET ORAL
Qty: 0 | Refills: 0 | DISCHARGE

## 2022-05-17 RX ORDER — NIFEDIPINE 30 MG
1 TABLET, EXTENDED RELEASE 24 HR ORAL
Qty: 0 | Refills: 0 | DISCHARGE

## 2022-05-17 RX ORDER — ASPIRIN/CALCIUM CARB/MAGNESIUM 324 MG
1 TABLET ORAL
Qty: 0 | Refills: 0 | DISCHARGE

## 2022-05-17 RX ADMIN — SODIUM CHLORIDE 100 MILLILITER(S): 9 INJECTION, SOLUTION INTRAVENOUS at 19:22

## 2022-05-17 NOTE — ASU DISCHARGE PLAN (ADULT/PEDIATRIC) - CARE PROVIDER_API CALL
Mikhail Aparicio)  Urology  [address] [place] [address] [address] [place] [address]  Phone: [phone]  Fax: [phone]  Established Patient  Follow Up Time:

## 2022-05-17 NOTE — ASU PATIENT PROFILE, ADULT - AS SC BRADEN SENSORY
CMS/HCC)    • Depression     Previously admitted to [facility].   • Darian Dale MD     (4) no impairment

## 2022-05-17 NOTE — ASU PATIENT PROFILE, ADULT - AS SC BRADEN MOISTURE
Patient called said  He had been to [facility] ER last night . He  was smothering ( ER dr thought possibly decreased cardiac output )  . He was given 5 days of Lasix and Steroid injection he had relief after injection . He is requesting a follow up appointment with you , he had testing done July 2019 . I will send for ER notes .   (3) occasionally moist

## 2022-05-17 NOTE — ASU DISCHARGE PLAN (ADULT/PEDIATRIC) - NS MD DC FALL RISK RISK
For information on Fall & Injury Prevention, visit: [email or web address] OR  [email or web address] OR  https://www.cdc.gov/steadi/patient.html

## 2022-05-17 NOTE — BRIEF OPERATIVE NOTE - NSICDXBRIEFPROCEDURE_GEN_ALL_CORE_FT
PROCEDURES:  Extracorporeal shockwave lithotripsy (ESWL) of calculus of right kidney with fluoroscopic guidance 17-May-2022 18:39:52  Mikhail Aparicio

## 2022-05-23 DIAGNOSIS — N39.0 URINARY TRACT INFECTION, SITE NOT SPECIFIED: ICD-10-CM

## 2022-05-23 DIAGNOSIS — E66.9 OBESITY, UNSPECIFIED: ICD-10-CM

## 2022-05-23 DIAGNOSIS — I10 ESSENTIAL (PRIMARY) HYPERTENSION: ICD-10-CM

## 2022-05-23 DIAGNOSIS — N20.0 CALCULUS OF KIDNEY: ICD-10-CM

## 2022-05-23 DIAGNOSIS — Z79.82 LONG TERM (CURRENT) USE OF ASPIRIN: ICD-10-CM

## 2022-05-23 DIAGNOSIS — M17.0 BILATERAL PRIMARY OSTEOARTHRITIS OF KNEE: ICD-10-CM

## 2022-05-23 DIAGNOSIS — Z85.46 PERSONAL HISTORY OF MALIGNANT NEOPLASM OF PROSTATE: ICD-10-CM

## 2022-05-25 ENCOUNTER — APPOINTMENT (OUTPATIENT)
Dept: HEMATOLOGY ONCOLOGY | Facility: CLINIC | Age: 67
End: 2022-05-25

## 2022-05-25 DIAGNOSIS — Z00.00 ENCOUNTER FOR GENERAL ADULT MEDICAL EXAMINATION W/OUT ABNORMAL FINDINGS: ICD-10-CM

## 2022-05-26 LAB — PSA SERPL-MCNC: 2.51 NG/ML

## 2022-05-27 ENCOUNTER — OUTPATIENT (OUTPATIENT)
Dept: OUTPATIENT SERVICES | Facility: HOSPITAL | Age: 67
LOS: 1 days | Discharge: HOME | End: 2022-05-27

## 2022-05-27 ENCOUNTER — APPOINTMENT (OUTPATIENT)
Dept: HEMATOLOGY ONCOLOGY | Facility: CLINIC | Age: 67
End: 2022-05-27
Payer: MEDICARE

## 2022-05-27 VITALS
DIASTOLIC BLOOD PRESSURE: 96 MMHG | WEIGHT: 290 LBS | SYSTOLIC BLOOD PRESSURE: 159 MMHG | TEMPERATURE: 97.5 F | BODY MASS INDEX: 40.6 KG/M2 | HEART RATE: 65 BPM | HEIGHT: 71 IN | RESPIRATION RATE: 18 BRPM

## 2022-05-27 DIAGNOSIS — Z90.79 ACQUIRED ABSENCE OF OTHER GENITAL ORGAN(S): Chronic | ICD-10-CM

## 2022-05-27 DIAGNOSIS — C61 MALIGNANT NEOPLASM OF PROSTATE: ICD-10-CM

## 2022-05-27 DIAGNOSIS — Z98.890 OTHER SPECIFIED POSTPROCEDURAL STATES: Chronic | ICD-10-CM

## 2022-05-27 PROCEDURE — 99214 OFFICE O/P EST MOD 30 MIN: CPT

## 2022-05-27 NOTE — ASSESSMENT
[FreeTextEntry1] : # Prostate cancer at 48yo (2005) [place] 3+4; s/p RRP. Never was recommended ADT\par - pt is referred for biochemical recurrence (PSA 1.3-2 [id number]; last PSA availble 05/2021\par - bone scan was negative in 2021\par - Most recent PSA is 2.5ng/mL from 03/2022.  \par - PET/CT PSMA from 3.28.2022 does not show any definite sites of abnormal uptake to suggest metastatic disease, but noted focal intense abnormal uptake within the stomach fundus of unclear etiology and may be inflammatory\par - discussed options for care including continued observation vs EBRT ± ADT ; agreed to closely monitor for now as he remains asymptomatic and if there are signs of local recurrence, will refer to RT\par - PSA from 05/2022 is essentially stable at 2.5ng/mL\par - recommend to repeat PSA in 3 months and continue surveillance at this time; PSMA PET also noted abnormal uptake in the stomach: recommend EGD / visit to GI (previously followed with Dr. Granado)\par \par Encouraged to achieve healthy body weight through lifestyle modification which may include increased physical activity, as tolerated, and dietary modifications/portion control.\par \par He is planning to move down to [place], [place] next week but will continue care here since he will be back and forth for now ; encouraged to obtain all medical records and imaging to establish with new providers when he fully relocates to [place]. \par \par RTC in 2 months with CBC, BMP, LFTs, PSA 2 days prior\par SEEN/Examined w/ NP C.Smart note reviewed; case discussed; agree w/ plan above

## 2022-05-27 NOTE — HISTORY OF PRESENT ILLNESS
[de-identified] : 67 yo  man with ECOG 1-2 and pmh of morbid obesity and diabetes, has prostate cancer treated in 2005 with RRP; at that time, [place] 3+4 (7). He is referred to oncology for biochemical recurrence. PSA available in the system in 2019, 2020,2021 is 1.3-2; the last PSA was done in May 2021 in the system; bone scan done in 08/2021 did not reveal bone mets; CT A/P 01/2022 DID not show any mets; showed hepatic steatosis.\par \par NO smoking\par FH of prostate cancer in father in his 70s\par  [de-identified] : 4/1/22\par Patient is here for a follow-up visit for Prostate Cancer.  He is feeling well with no new complaints.  Reviewed most recent PET/CT which does not show any definite sites of abnormal uptake to suggest metastatic disease, but noted focal intense abnormal uptake within the stomach fundus of unclear etiology and may be inflammatory.  Patient denies dyspnea, abdominal pain, new bony pain or bleeding.  Most recent PSA is 2.5ng/mL from 03/2022.  \par PET/CT PSMA (3.28.2022) IMPRESSION:No definite sites of abnormal PYLARIFY uptake to suggest metastatic disease.Focal intense abnormal PYLARIFY uptake within the stomach fundus of unclear etiology and may be inflammatory; however neoplasm is not excluded. Correlation with direct visualization suggested.\par \par 5/27/22\par Patient is here for a follow-up visit for Prostate Cancer.  He is feeling well with no new complaints.  Patient denies dyspnea, abdominal pain, new bony pain, abd pain or bleeding.  Most recent PSA is 2.51ng/mL from 05/2022.  He recently followed with Dr. Aparicio for management of stones.  He is moving down to [place] next week but plans to continue care here for now until he establishes there since he is back and forth.

## 2022-07-05 ENCOUNTER — APPOINTMENT (OUTPATIENT)
Dept: HEMATOLOGY ONCOLOGY | Facility: CLINIC | Age: 67
End: 2022-07-05

## 2022-07-05 ENCOUNTER — OUTPATIENT (OUTPATIENT)
Dept: OUTPATIENT SERVICES | Facility: HOSPITAL | Age: 67
LOS: 1 days | Discharge: HOME | End: 2022-07-05

## 2022-07-05 ENCOUNTER — APPOINTMENT (OUTPATIENT)
Dept: UROLOGY | Facility: CLINIC | Age: 67
End: 2022-07-05

## 2022-07-05 VITALS
HEART RATE: 63 BPM | WEIGHT: 294 LBS | DIASTOLIC BLOOD PRESSURE: 88 MMHG | SYSTOLIC BLOOD PRESSURE: 144 MMHG | HEIGHT: 71 IN | BODY MASS INDEX: 41.16 KG/M2

## 2022-07-05 DIAGNOSIS — Z90.79 ACQUIRED ABSENCE OF OTHER GENITAL ORGAN(S): Chronic | ICD-10-CM

## 2022-07-05 DIAGNOSIS — N20.0 CALCULUS OF KIDNEY: ICD-10-CM

## 2022-07-05 DIAGNOSIS — Z98.890 OTHER SPECIFIED POSTPROCEDURAL STATES: Chronic | ICD-10-CM

## 2022-07-05 PROCEDURE — 74019 RADEX ABDOMEN 2 VIEWS: CPT | Mod: 26

## 2022-07-05 PROCEDURE — 99024 POSTOP FOLLOW-UP VISIT: CPT

## 2022-07-05 RX ORDER — AMOXICILLIN AND CLAVULANATE POTASSIUM 500; 125 MG/1; MG/1
500-125 TABLET, FILM COATED ORAL
Qty: 14 | Refills: 0 | Status: COMPLETED | COMMUNITY
Start: 2022-05-06 | End: 2022-07-05

## 2022-07-05 NOTE — ASSESSMENT
[FreeTextEntry1] : KUB has not changed is much as I do like and it has been 6 or 7 weeks.  It is summertime he does go swimming I have asked him to try and swim now stone fragments into the renal pelvis and then swim on his left side perhaps quicker the side stroke so when they looked down they will settle into the renal pelvis and he could pass them.  We will get another KUB in 6 weeks if the stone is still there we can try a second time.

## 2022-07-05 NOTE — LETTER HEADER
[FreeTextEntry3] : Mikhail Aparicio M.D.\par Director Emeritus of Urology\par [facility]\par [address] [place] [address], [address]\[place] [address]

## 2022-07-05 NOTE — LETTER BODY
[Dear  ___] : Dear  [unfilled], [Courtesy Letter:] : I had the pleasure of seeing your patient, [unfilled], in my office today. [Please see my note below.] : Please see my note below. [Sincerely,] : Sincerely, [FreeTextEntry2] : Margaret Pride MD\par [address] [place] [address]\[place] [address]\par

## 2022-07-05 NOTE — HISTORY OF PRESENT ILLNESS
[FreeTextEntry1] : Gaston is a 66-year-old male born November 3, 1955 last seen on May 17, 2022 when he had shockwave lithotripsy of a right lower pole stone.  He was post to follow-up 2 to 3 weeks later.  He could not work it out until today, had KUB done is here to review.  He tells me that he strained his urine for up to 2 weeks did not really see anything. [Currently Experiencing ___] :  [unfilled] [Urinary Incontinence] : urinary incontinence [Urinary Urgency] : urinary urgency [Urinary Frequency] : urinary frequency [Nocturia] : nocturia [Straining] : no straining [Weak Stream] : no weak stream [Erectile Dysfunction] : Erectile Dysfunction [Dysuria] : no dysuria [Hematuria - Gross] : no gross hematuria

## 2022-07-05 NOTE — PHYSICAL EXAM
[General Appearance - Well Developed] : well developed [General Appearance - Well Nourished] : well nourished [Normal Appearance] : normal appearance [Well Groomed] : well groomed [General Appearance - In No Acute Distress] : no acute distress [FreeTextEntry1] : BMI equals 41 [Abdomen Soft] : soft [Abdomen Tenderness] : non-tender [Costovertebral Angle Tenderness] : no ~M costovertebral angle tenderness [] : no respiratory distress [Respiration, Rhythm And Depth] : normal respiratory rhythm and effort [Exaggerated Use Of Accessory Muscles For Inspiration] : no accessory muscle use [Oriented To Time, Place, And Person] : oriented to person, place, and time [Affect] : the affect was normal [Not Anxious] : not anxious [Mood] : the mood was normal [Normal Station and Gait] : the gait and station were normal for the patient's age

## 2022-07-06 LAB
APPEARANCE: CLEAR
BILIRUBIN URINE: NEGATIVE
BLOOD URINE: NEGATIVE
COLOR: NORMAL
GLUCOSE QUALITATIVE U: NEGATIVE
KETONES URINE: NEGATIVE
LEUKOCYTE ESTERASE URINE: NEGATIVE
NITRITE URINE: NEGATIVE
PH URINE: 6
PROTEIN URINE: NEGATIVE
PSA SERPL-MCNC: 2.48 NG/ML
SPECIFIC GRAVITY URINE: 1.02
UROBILINOGEN URINE: NORMAL

## 2022-07-07 ENCOUNTER — APPOINTMENT (OUTPATIENT)
Dept: HEMATOLOGY ONCOLOGY | Facility: CLINIC | Age: 67
End: 2022-07-07

## 2022-07-07 ENCOUNTER — OUTPATIENT (OUTPATIENT)
Dept: OUTPATIENT SERVICES | Facility: HOSPITAL | Age: 67
LOS: 1 days | Discharge: HOME | End: 2022-07-07

## 2022-07-07 DIAGNOSIS — Z90.79 ACQUIRED ABSENCE OF OTHER GENITAL ORGAN(S): Chronic | ICD-10-CM

## 2022-07-07 DIAGNOSIS — N20.0 CALCULUS OF KIDNEY: ICD-10-CM

## 2022-07-07 DIAGNOSIS — Z02.9 ENCOUNTER FOR ADMINISTRATIVE EXAMINATIONS, UNSPECIFIED: ICD-10-CM

## 2022-07-07 DIAGNOSIS — C61 MALIGNANT NEOPLASM OF PROSTATE: ICD-10-CM

## 2022-07-07 DIAGNOSIS — Z98.890 OTHER SPECIFIED POSTPROCEDURAL STATES: Chronic | ICD-10-CM

## 2022-07-07 PROCEDURE — 99214 OFFICE O/P EST MOD 30 MIN: CPT | Mod: 95

## 2022-07-07 NOTE — ASSESSMENT
[FreeTextEntry1] : # Prostate cancer at 48yo (2005) [place] 3+4; s/p RRP. Never was recommended ADT\par - pt is referred for biochemical recurrence (PSA 1.3-2 [id number]; last PSA availble 05/2021\par - bone scan was negative in 2021\par - Most recent PSA is 2.5ng/mL from 03/2022.  \par - PET/CT PSMA from 3.28.2022 does not show any definite sites of abnormal uptake to suggest metastatic disease, but noted focal intense abnormal uptake within the stomach fundus of unclear etiology and may be inflammatory\par - discussed options for care including continued observation vs EBRT ± ADT ; agreed to closely monitor for now as he remains asymptomatic and if there are signs of local recurrence, will refer to RT\par - PSA from 05/2022 is essentially stable at 2.5ng/mL\par - recommend to repeat PSA in 3 months and continue surveillance at this time; PSMA PET also noted abnormal uptake in the stomach: recommend EGD / visit to GI (previously followed with Dr. Granado)\par \par Encouraged to achieve healthy body weight through lifestyle modification which may include increased physical activity, as tolerated, and dietary modifications/portion control.\par \par He is planning to move down to [place], [place] next week but will continue care here since he will be back and forth for now ; encouraged to obtain all medical records and imaging to establish with new providers when he fully relocates to [place]. \par \par RTC in 3-4 months with CBC, BMP, LFTs, PSA 2 days prior

## 2022-07-07 NOTE — REASON FOR VISIT
[Follow-Up Visit] : a follow-up [Home] : at home, [unfilled] , at the time of the visit. [Medical Office: ([facility])___] : at the medical office located in  [Spouse] : spouse [Patient] : the patient [FreeTextEntry2] : prostate cancer

## 2022-07-07 NOTE — HISTORY OF PRESENT ILLNESS
[de-identified] : 65 yo  man with ECOG 1-2 and pmh of morbid obesity and diabetes, has prostate cancer treated in 2005 with RRP; at that time, [place] 3+4 (7). He is referred to oncology for biochemical recurrence. PSA available in the system in 2019, 2020,2021 is 1.3-2; the last PSA was done in May 2021 in the system; bone scan done in 08/2021 did not reveal bone mets; CT A/P 01/2022 DID not show any mets; showed hepatic steatosis.\par \par NO smoking\par FH of prostate cancer in father in his 70s\par  [de-identified] : 4/1/22\par Patient is here for a follow-up visit for Prostate Cancer.  He is feeling well with no new complaints.  Reviewed most recent PET/CT which does not show any definite sites of abnormal uptake to suggest metastatic disease, but noted focal intense abnormal uptake within the stomach fundus of unclear etiology and may be inflammatory.  Patient denies dyspnea, abdominal pain, new bony pain or bleeding.  Most recent PSA is 2.5ng/mL from 03/2022.  \par PET/CT PSMA (3.28.2022) IMPRESSION:No definite sites of abnormal PYLARIFY uptake to suggest metastatic disease.Focal intense abnormal PYLARIFY uptake within the stomach fundus of unclear etiology and may be inflammatory; however neoplasm is not excluded. Correlation with direct visualization suggested.\par \par 5/27/22\par Patient is here for a follow-up visit for Prostate Cancer.  He is feeling well with no new complaints.  Patient denies dyspnea, abdominal pain, new bony pain, abd pain or bleeding.  Most recent PSA is 2.51ng/mL from 05/2022.  He recently followed with Dr. Aparicio for management of stones.  He is moving down to [place] next week but plans to continue care here for now until he establishes there since he is back and forth.

## 2022-07-08 ENCOUNTER — NON-APPOINTMENT (OUTPATIENT)
Age: 67
End: 2022-07-08

## 2022-07-15 ENCOUNTER — APPOINTMENT (OUTPATIENT)
Dept: HEMATOLOGY ONCOLOGY | Facility: CLINIC | Age: 67
End: 2022-07-15

## 2022-07-18 LAB — BACTERIA UR CULT: ABNORMAL

## 2022-08-15 ENCOUNTER — APPOINTMENT (OUTPATIENT)
Dept: UROLOGY | Facility: CLINIC | Age: 67
End: 2022-08-15

## 2022-10-11 ENCOUNTER — APPOINTMENT (OUTPATIENT)
Dept: HEMATOLOGY ONCOLOGY | Facility: CLINIC | Age: 67
End: 2022-10-11

## 2022-10-13 ENCOUNTER — APPOINTMENT (OUTPATIENT)
Dept: HEMATOLOGY ONCOLOGY | Facility: CLINIC | Age: 67
End: 2022-10-13

## 2022-11-09 ENCOUNTER — APPOINTMENT (OUTPATIENT)
Dept: UROLOGY | Facility: CLINIC | Age: 67
End: 2022-11-09

## 2023-07-03 ENCOUNTER — APPOINTMENT (OUTPATIENT)
Dept: UROLOGY | Facility: CLINIC | Age: 68
End: 2023-07-03
Payer: MEDICARE

## 2023-07-03 VITALS
DIASTOLIC BLOOD PRESSURE: 88 MMHG | RESPIRATION RATE: 16 BRPM | HEART RATE: 61 BPM | SYSTOLIC BLOOD PRESSURE: 146 MMHG | WEIGHT: 272 LBS | OXYGEN SATURATION: 99 % | HEIGHT: 71 IN | BODY MASS INDEX: 38.08 KG/M2 | TEMPERATURE: 98 F

## 2023-07-03 DIAGNOSIS — N20.0 CALCULUS OF KIDNEY: ICD-10-CM

## 2023-07-03 DIAGNOSIS — R79.89 OTHER SPECIFIED ABNORMAL FINDINGS OF BLOOD CHEMISTRY: ICD-10-CM

## 2023-07-03 DIAGNOSIS — C61 MALIGNANT NEOPLASM OF PROSTATE: ICD-10-CM

## 2023-07-03 DIAGNOSIS — R97.21 RISING PSA FOLLOWING TREATMENT FOR MALIGNANT NEOPLASM OF PROSTATE: ICD-10-CM

## 2023-07-03 PROCEDURE — 99214 OFFICE O/P EST MOD 30 MIN: CPT

## 2023-07-03 NOTE — HISTORY OF PRESENT ILLNESS
[Currently Experiencing ___] :  [unfilled] [Urinary Incontinence] : urinary incontinence [Urinary Urgency] : urinary urgency [Urinary Frequency] : urinary frequency [Nocturia] : nocturia [Erectile Dysfunction] : Erectile Dysfunction [FreeTextEntry1] : Gaston is a 66-year-old male, born November 30, 1955 last seen July 5, 2022 2 days shy of a year ago, with a history of prostate cancer status post RRP in 2005, Abdirizak 7 (3+4) with biochemical recurrence, bone was under observation but did not wish for ADT, who we last saw in June 2022 status post right ESWL on 5/17/2022.\par \par He was supposed to follow-up 2 to 3 weeks later[place].  He could not work it out until today, though he told me he is up here frequently as it is only an hour and a half drive, had KUB done is here to review.  He tells me that he strained his urine for up to 2 weeks did not really see anything.\par \par He is using the injections though not as frequently as in the past and says they are not great but they suffice.  The last time he had orgasm was over 5 days ago\par \par As far as his low testosterone we are not treating it as practically we would prefer androgen deprivation therapy which surely not going to give him androgen supplementation therapy at this point in time [Straining] : no straining [Weak Stream] : no weak stream [Dysuria] : no dysuria [Hematuria - Gross] : no gross hematuria

## 2023-07-03 NOTE — LETTER HEADER
[FreeTextEntry3] : Mikhail Aparicio M.D.\par Director Emeritus of Urology\par [facility]\par [address] [place] [address], [address]\[place] [address]

## 2023-07-03 NOTE — ASSESSMENT
[FreeTextEntry1] : He has not been seen in close to a year.  I have no idea what happened with his stones I have no idea what is going with his scan negative but elevated PSA prostate cancer, and I am not sure what is going on with his erections.  We are not treating his low testosterone and he may or may not be advised to undergo androgen deprivation therapy\par \par We will get a CAT scan getting it as being done low-dose so they can look at the bones as well as his kidneys, he will get a PSA, follow-up with medical oncology and follow-up with us him

## 2023-07-03 NOTE — LETTER BODY
[Dear  ___] : Dear  [unfilled], [Courtesy Letter:] : I had the pleasure of seeing your patient, [unfilled], in my office today. [Please see my note below.] : Please see my note below. [Sincerely,] : Sincerely, [FreeTextEntry2] : Margaret Pride MD\par [address] [place] [address]\[place] [address]\par  [DrJennifer  ___] : Dr. KELSEY

## 2023-07-03 NOTE — PHYSICAL EXAM
[General Appearance - Well Developed] : well developed [General Appearance - Well Nourished] : well nourished [Normal Appearance] : normal appearance [Well Groomed] : well groomed [General Appearance - In No Acute Distress] : no acute distress [FreeTextEntry1] : BMI equals 37.9 [Abdomen Soft] : soft [Abdomen Tenderness] : non-tender [Costovertebral Angle Tenderness] : no ~M costovertebral angle tenderness [] : no respiratory distress [Respiration, Rhythm And Depth] : normal respiratory rhythm and effort [Exaggerated Use Of Accessory Muscles For Inspiration] : no accessory muscle use [Oriented To Time, Place, And Person] : oriented to person, place, and time [Affect] : the affect was normal [Mood] : the mood was normal [Not Anxious] : not anxious [Normal Station and Gait] : the gait and station were normal for the patient's age [No Focal Deficits] : no focal deficits

## 2023-07-24 ENCOUNTER — APPOINTMENT (OUTPATIENT)
Dept: HEMATOLOGY ONCOLOGY | Facility: CLINIC | Age: 68
End: 2023-07-24

## 2023-07-25 ENCOUNTER — APPOINTMENT (OUTPATIENT)
Dept: HEMATOLOGY ONCOLOGY | Facility: CLINIC | Age: 68
End: 2023-07-25

## 2023-08-28 ENCOUNTER — APPOINTMENT (OUTPATIENT)
Dept: UROLOGY | Facility: CLINIC | Age: 68
End: 2023-08-28
Payer: MEDICARE

## 2023-08-28 VITALS
SYSTOLIC BLOOD PRESSURE: 152 MMHG | TEMPERATURE: 98 F | HEART RATE: 90 BPM | DIASTOLIC BLOOD PRESSURE: 79 MMHG | WEIGHT: 273 LBS | HEIGHT: 71 IN | BODY MASS INDEX: 38.22 KG/M2

## 2023-08-28 DIAGNOSIS — N52.31 ERECTILE DYSFUNCTION FOLLOWING RADICAL PROSTATECTOMY: ICD-10-CM

## 2023-08-28 DIAGNOSIS — Z87.442 PERSONAL HISTORY OF URINARY CALCULI: ICD-10-CM

## 2023-08-28 DIAGNOSIS — Z71.2 PERSON CONSULTING FOR EXPLANATION OF EXAMINATION OR TEST FINDINGS: ICD-10-CM

## 2023-08-28 PROCEDURE — 99213 OFFICE O/P EST LOW 20 MIN: CPT

## 2023-08-28 RX ORDER — PAPAVER/PHENTOLAMINE/ALPROSTAD 150-5-50
150-5-50 VIAL (EA) INTRACAVERNOSAL
Qty: 10 | Refills: 0 | Status: ACTIVE | COMMUNITY
Start: 2023-08-28 | End: 1900-01-01

## 2023-08-28 NOTE — LETTER BODY
[Dear  ___] : Dear  [unfilled], [Courtesy Letter:] : I had the pleasure of seeing your patient, [unfilled], in my office today. [Please see my note below.] : Please see my note below. [Sincerely,] : Sincerely, [DrJennifer  ___] : Dr. KELSEY [FreeTextEntry2] : Margaret Pride MD\par  [address] [place] [address]\[place] [address]\par

## 2023-08-28 NOTE — ASSESSMENT
[FreeTextEntry1] : His PSA has gone up significantly he told me the oncologist has a PSA ordered I do not think he needs another 1 I suggested he contact the oncologist to let them know that we got 1 and had like him seen he will see if he can get him in sooner.  As far as the Trimix we will give him the option of doing it with men [facility] or with the place that we found is doing it for low cost.  For his ED he will see me in 3 months if there are issues which need urologic involvement for the PSA elevation they will contact Dr. Serrano  As far as the kidney stones we will see what the CAT scan shows that it is anything to do with we will call him in sooner

## 2023-08-28 NOTE — PHYSICAL EXAM
[Urethral Meatus] : meatus normal [Penis Abnormality] : normal uncircumcised penis [] : no respiratory distress [Respiration, Rhythm And Depth] : normal respiratory rhythm and effort [Exaggerated Use Of Accessory Muscles For Inspiration] : no accessory muscle use [Oriented To Time, Place, And Person] : oriented to person, place, and time [Affect] : the affect was normal [Mood] : the mood was normal [Not Anxious] : not anxious [FreeTextEntry1] : no fibrosis. small left hydrocele

## 2023-08-28 NOTE — HISTORY OF PRESENT ILLNESS
[FreeTextEntry1] : Gaston is a 67-year-old -American male born November 30, 1955 seen July 2023.  He had shockwave lithotripsy in May 2022  Follow-up studies, he has a rising PSA we had not had a repeat so we sent him for 1.  He was on low T but we held the treatment as if he has a recurrence we would consider androgen deprivation right millimicron to put him on testosterone doing it was going on.  Probably for his ED he was on Trimix has not used in a month and would like a refill.  He comes in tell me the CAT scan is not until tomorrow and he did not call us and tell us  PSA came back and is now 3.8 on August 22, 2023, I called the oncologist who said that Gaston had rearranged his schedule he will be happy to see him

## 2023-08-28 NOTE — LETTER HEADER
[FreeTextEntry3] : Mikhail Aparicio MD  [address] [place] [address], [address] [place] [id number]

## 2023-08-29 ENCOUNTER — NON-APPOINTMENT (OUTPATIENT)
Age: 68
End: 2023-08-29

## 2023-08-29 ENCOUNTER — RESULT REVIEW (OUTPATIENT)
Age: 68
End: 2023-08-29

## 2023-08-29 ENCOUNTER — OUTPATIENT (OUTPATIENT)
Dept: OUTPATIENT SERVICES | Facility: HOSPITAL | Age: 68
LOS: 1 days | End: 2023-08-29
Payer: MEDICARE

## 2023-08-29 DIAGNOSIS — N20.0 CALCULUS OF KIDNEY: ICD-10-CM

## 2023-08-29 DIAGNOSIS — Z90.79 ACQUIRED ABSENCE OF OTHER GENITAL ORGAN(S): Chronic | ICD-10-CM

## 2023-08-29 DIAGNOSIS — Z00.8 ENCOUNTER FOR OTHER GENERAL EXAMINATION: ICD-10-CM

## 2023-08-29 DIAGNOSIS — Z98.890 OTHER SPECIFIED POSTPROCEDURAL STATES: Chronic | ICD-10-CM

## 2023-08-29 LAB — PSA, POST - PROSTATECTOMY: 3.11 NG/ML

## 2023-08-29 PROCEDURE — 74176 CT ABD & PELVIS W/O CONTRAST: CPT | Mod: 26

## 2023-08-29 PROCEDURE — 74176 CT ABD & PELVIS W/O CONTRAST: CPT

## 2023-08-30 DIAGNOSIS — N20.0 CALCULUS OF KIDNEY: ICD-10-CM

## 2023-09-14 ENCOUNTER — CLAIMS CREATED FROM THE CLAIM WINDOW (OUTPATIENT)
Dept: URBAN - METROPOLITAN AREA MEDICAL CENTER 9 | Facility: MEDICAL CENTER | Age: 68
End: 2023-09-14
Payer: MEDICARE

## 2023-09-14 DIAGNOSIS — K75.0 ABSCESS OF LIVER: ICD-10-CM

## 2023-09-14 PROCEDURE — 99222 1ST HOSP IP/OBS MODERATE 55: CPT | Performed by: INTERNAL MEDICINE

## 2023-09-14 PROCEDURE — 99254 IP/OBS CNSLTJ NEW/EST MOD 60: CPT | Performed by: INTERNAL MEDICINE

## 2023-09-14 PROCEDURE — G8427 DOCREV CUR MEDS BY ELIG CLIN: HCPCS | Performed by: INTERNAL MEDICINE

## 2023-09-15 ENCOUNTER — TELEPHONE ENCOUNTER (OUTPATIENT)
Dept: URBAN - METROPOLITAN AREA CLINIC 40 | Facility: CLINIC | Age: 68
End: 2023-09-15

## 2023-09-15 ENCOUNTER — TRANSCRIPTION ENCOUNTER (OUTPATIENT)
Age: 68
End: 2023-09-15

## 2023-10-02 ENCOUNTER — APPOINTMENT (OUTPATIENT)
Dept: HEMATOLOGY ONCOLOGY | Facility: CLINIC | Age: 68
End: 2023-10-02

## 2023-10-04 ENCOUNTER — APPOINTMENT (OUTPATIENT)
Dept: HEMATOLOGY ONCOLOGY | Facility: CLINIC | Age: 68
End: 2023-10-04

## 2023-10-05 ENCOUNTER — APPOINTMENT (OUTPATIENT)
Dept: HEMATOLOGY ONCOLOGY | Facility: CLINIC | Age: 68
End: 2023-10-05
Payer: MEDICARE

## 2023-10-05 ENCOUNTER — OUTPATIENT (OUTPATIENT)
Dept: OUTPATIENT SERVICES | Facility: HOSPITAL | Age: 68
LOS: 1 days | End: 2023-10-05
Payer: MEDICARE

## 2023-10-05 DIAGNOSIS — C61 MALIGNANT NEOPLASM OF PROSTATE: ICD-10-CM

## 2023-10-05 DIAGNOSIS — Z98.890 OTHER SPECIFIED POSTPROCEDURAL STATES: Chronic | ICD-10-CM

## 2023-10-05 DIAGNOSIS — Z90.79 ACQUIRED ABSENCE OF OTHER GENITAL ORGAN(S): Chronic | ICD-10-CM

## 2023-10-05 PROCEDURE — 99215 OFFICE O/P EST HI 40 MIN: CPT | Mod: 95

## 2023-10-06 DIAGNOSIS — C61 MALIGNANT NEOPLASM OF PROSTATE: ICD-10-CM

## 2023-10-13 NOTE — END OF VISIT
Medication: Requested Renewals     sertraline (ZOLOFT) 50 MG tablet         Sig: Take 1 tablet by mouth daily.    Disp:  90 tablet    Refills:  0    Start: 10/13/2023    Class: Eprescribe    Non-formulary For: Anxiety    Last ordered: 2 months ago (8/14/2023) by Dileep Milligan PA-C    SSRI (Selective Serotonin Reuptake Inhibitors) Adult Refill Protocol - 12 Month Protocol Passed 10/13/2023 12:24 PM   Protocol Details  Seen by prescribing provider or same department within the last 12 months or has a future appt in 3 months - IF FAILED PLEASE LOOK AT CHART REVIEW FOR LAST VISIT AND PROCEED ACCORDINGLY    Medication (including dose and sig) on current meds list      To be filled at: [facility] [address] [place] [address] [address] [place] [address]          Last office visit date: 8/03/22  Next appointment scheduled?: Yes 11/06/23  Number of refillsQ given: #90   [>50% of Time Spent on Counseling for ____] : Greater than 50% of the encounter time was spent on counseling for [unfilled] [Time Spent: ___ minutes] : I have spent [unfilled] minutes of face to face time with the patient

## 2023-10-21 NOTE — H&P PST ADULT - TEACHING/LEARNING EDUCATIONAL LEVEL
Hospital Medicine Discharge Summary      Patient Identification:   Jv Olmedo   : 1971  MRN: [id number]   Account: [de-identified]      Patient's PCP: Parker Herman MD    Admit Date: 10/18/2023     Discharge Date:   10/21/2023    Admitting Physician: Delfino Weaver MD     Discharging Nurse Practitioner: Lopez Phelan, APRN - CNP     Discharge Diagnoses with Assessment/Plan:  Acute alcohol intoxication with BAL 0.17 with acute withdrawal--phenobarbital prophylactic dose started 10/18-10/20; on thiamine, folic acid and multivitamin; appreciate addiction services input; patient would like to go to [place] on discharge and discussed with   Primary hypertension, uncontrolled--on Toprol, Lisinopril 2.5 mg daily with hold parameters; monitor  Hyperlipidemia--statin  Major depression--treated  History recurrent DVTs  Obesity class III with BMI 40.83     The patient was seen and examined on day of discharge and this discharge summary is in conjunction with any daily progress note from day of discharge. Hospital Course:   Jv Olmedo is a 46 y.o. male admitted to [facility] on 10/18/2023 for alcohol abuse; Per H&P done 10/18/2023: \"Patient transferred from [facility] for further evaluation and treatment of alcohol withdrawal.  Patient presented to the ED there with acute alcohol intoxication. His initial alcohol level was 0.33. The patient is requesting alcohol detox. Patient states he has been drinking since he was 23years old. He has never had a problem with drug abuse. He drinks greater than 20 beers daily. The patient is motivated by the upcoming birth of his first grandchild. Patient will be admitted for medical detox. \"     10/18--> hemodynamically stable, tachycardia has resolved; chart reviewed     10/19--> blood pressure on the higher side so add lisinopril 5 mg daily and monitor; will stop telemetry and IV fluids     10/20--> hemodynamically
graduate school

## 2023-12-04 ENCOUNTER — APPOINTMENT (OUTPATIENT)
Dept: UROLOGY | Facility: CLINIC | Age: 68
End: 2023-12-04

## 2025-02-01 NOTE — ASU PATIENT PROFILE, ADULT - FALL HARM RISK - HARM RISK INTERVENTIONS
